# Patient Record
Sex: FEMALE | Race: BLACK OR AFRICAN AMERICAN | NOT HISPANIC OR LATINO | Employment: UNEMPLOYED | URBAN - METROPOLITAN AREA
[De-identification: names, ages, dates, MRNs, and addresses within clinical notes are randomized per-mention and may not be internally consistent; named-entity substitution may affect disease eponyms.]

---

## 2017-05-23 ENCOUNTER — HISTORICAL (OUTPATIENT)
Dept: ADMINISTRATIVE | Facility: HOSPITAL | Age: 37
End: 2017-05-23

## 2017-05-23 LAB
ABS NEUT (OLG): 6.85 X10(3)/MCL (ref 2.1–9.2)
ALBUMIN SERPL-MCNC: 3.4 GM/DL (ref 3.4–5)
ALBUMIN/GLOB SERPL: 1 {RATIO}
ALP SERPL-CCNC: 66 UNIT/L (ref 20–120)
ALT SERPL-CCNC: 14 UNIT/L
APPEARANCE, UA: CLEAR
AST SERPL-CCNC: 15 UNIT/L
BACTERIA #/AREA URNS AUTO: ABNORMAL /[HPF]
BASOPHILS # BLD AUTO: 0.06 X10(3)/MCL
BASOPHILS NFR BLD AUTO: 1 % (ref 0–1)
BILIRUB SERPL-MCNC: 0.4 MG/DL
BILIRUB UR QL STRIP: NEGATIVE
BILIRUBIN DIRECT+TOT PNL SERPL-MCNC: <0.1 MG/DL
BILIRUBIN DIRECT+TOT PNL SERPL-MCNC: >0.3 MG/DL
BUN SERPL-MCNC: 40 MG/DL (ref 7–25)
CALCIUM SERPL-MCNC: 8.9 MG/DL (ref 8.4–10.3)
CHLORIDE SERPL-SCNC: 103 MMOL/L (ref 96–110)
CO2 SERPL-SCNC: 20 MMOL/L (ref 24–32)
COLOR UR: YELLOW
CREAT SERPL-MCNC: 2.83 MG/DL (ref 0.7–1.1)
CREAT UR-MCNC: 85.6 MG/DL
DEPRECATED CALCIDIOL+CALCIFEROL SERPL-MC: 22.07 NG/ML (ref 30–80)
EOSINOPHIL # BLD AUTO: 0.2 10*3/UL
EOSINOPHIL NFR BLD AUTO: 2 % (ref 0–5)
ERYTHROCYTE [DISTWIDTH] IN BLOOD BY AUTOMATED COUNT: 12.5 % (ref 11.5–14.5)
GLOBULIN SER-MCNC: 3.3 GM/ML (ref 2.3–3.5)
GLUCOSE (UA): 500 MG/DL
GLUCOSE SERPL-MCNC: 345 MG/DL (ref 65–99)
HCT VFR BLD AUTO: 34.5 % (ref 35–46)
HGB BLD-MCNC: 11.4 GM/DL (ref 12–16)
HGB UR QL STRIP: NEGATIVE
HYALINE CASTS #/AREA URNS LPF: 0 /[LPF]
IMM GRANULOCYTES # BLD AUTO: 0.02 10*3/UL
IMM GRANULOCYTES NFR BLD AUTO: 0 %
KETONES UR QL STRIP: NEGATIVE
LEUKOCYTE ESTERASE UR QL STRIP: NEGATIVE
LYMPHOCYTES # BLD AUTO: 2.22 X10(3)/MCL
LYMPHOCYTES NFR BLD AUTO: 22 % (ref 15–40)
MAGNESIUM SERPL-MCNC: 1.9 MG/DL (ref 1.5–2.6)
MCH RBC QN AUTO: 29.6 PG (ref 26–34)
MCHC RBC AUTO-ENTMCNC: 33 GM/DL (ref 31–37)
MCV RBC AUTO: 89.6 FL (ref 80–100)
MONOCYTES # BLD AUTO: 0.55 X10(3)/MCL
MONOCYTES NFR BLD AUTO: 6 % (ref 4–12)
MUCOUS THREADS URNS QL MICRO: ABNORMAL
NEUTROPHILS # BLD AUTO: 6.85 X10(3)/MCL
NEUTROPHILS NFR BLD AUTO: 69 X10(3)/MCL
NITRITE UR QL STRIP: NEGATIVE
PH UR STRIP: 8 [PH] (ref 4.5–8)
PHOSPHATE SERPL-MCNC: 3 MG/DL (ref 2.5–4.7)
PLATELET # BLD AUTO: 354 X10(3)/MCL (ref 130–400)
PMV BLD AUTO: 11.2 FL (ref 7.4–10.4)
POTASSIUM SERPL-SCNC: 3.8 MMOL/L (ref 3.6–5.2)
PROT SERPL-MCNC: 6.7 GM/DL (ref 6–8)
PROT UR QL STRIP: >600 MG/DL
PROT UR STRIP-MCNC: 461 MG/DL
PROT/CREAT UR-RTO: 5385.5 MG/GM
PTH-INTACT SERPL-MCNC: 123 PG/ML (ref 12–65)
RBC # BLD AUTO: 3.85 X10(6)/MCL (ref 4–5.2)
RBC #/AREA URNS AUTO: ABNORMAL /[HPF]
SODIUM SERPL-SCNC: 132 MMOL/L (ref 135–146)
SP GR UR STRIP: 1 (ref 1–1.03)
SQUAMOUS #/AREA URNS LPF: >100 /[LPF]
UROBILINOGEN UR STRIP-ACNC: NORMAL
WBC # SPEC AUTO: 9.9 X10(3)/MCL (ref 4.5–11)
WBC #/AREA URNS AUTO: ABNORMAL /HPF

## 2017-09-22 ENCOUNTER — HISTORICAL (OUTPATIENT)
Dept: ADMINISTRATIVE | Facility: HOSPITAL | Age: 37
End: 2017-09-22

## 2017-09-22 LAB
ABS NEUT (OLG): 7.31 X10(3)/MCL (ref 2.1–9.2)
ALBUMIN SERPL-MCNC: 2.8 GM/DL (ref 3.4–5)
ALBUMIN/GLOB SERPL: 1 RATIO (ref 1–2)
ALP SERPL-CCNC: 95 UNIT/L (ref 45–117)
ALT SERPL-CCNC: 15 UNIT/L (ref 12–78)
APPEARANCE, UA: CLEAR
AST SERPL-CCNC: 9 UNIT/L (ref 15–37)
BACTERIA #/AREA URNS AUTO: ABNORMAL /[HPF]
BASOPHILS # BLD AUTO: 0.05 X10(3)/MCL
BASOPHILS NFR BLD AUTO: 0 % (ref 0–1)
BILIRUB SERPL-MCNC: 0.2 MG/DL (ref 0.2–1)
BILIRUB UR QL STRIP: NEGATIVE
BILIRUBIN DIRECT+TOT PNL SERPL-MCNC: <0.1 MG/DL
BILIRUBIN DIRECT+TOT PNL SERPL-MCNC: >0.1 MG/DL
BUN SERPL-MCNC: 26 MG/DL (ref 7–18)
CALCIUM SERPL-MCNC: 8.8 MG/DL (ref 8.5–10.1)
CHLORIDE SERPL-SCNC: 108 MMOL/L (ref 98–107)
CO2 SERPL-SCNC: 25 MMOL/L (ref 21–32)
COLOR UR: ABNORMAL
CREAT SERPL-MCNC: 2.6 MG/DL (ref 0.6–1.3)
CREAT UR-MCNC: 34 MG/DL
DEPRECATED CALCIDIOL+CALCIFEROL SERPL-MC: 19.17 NG/ML (ref 30–80)
EOSINOPHIL # BLD AUTO: 0.15 X10(3)/MCL
EOSINOPHIL NFR BLD AUTO: 2 % (ref 0–5)
ERYTHROCYTE [DISTWIDTH] IN BLOOD BY AUTOMATED COUNT: 11.9 % (ref 11.5–14.5)
GLOBULIN SER-MCNC: 3.8 GM/ML (ref 2.3–3.5)
GLUCOSE (UA): >1000 MG/DL
GLUCOSE SERPL-MCNC: 219 MG/DL (ref 74–106)
HCT VFR BLD AUTO: 31.3 % (ref 35–46)
HGB BLD-MCNC: 10.2 GM/DL (ref 12–16)
HGB UR QL STRIP: NEGATIVE
HYALINE CASTS #/AREA URNS LPF: ABNORMAL /[LPF]
IMM GRANULOCYTES # BLD AUTO: 0.02 10*3/UL
IMM GRANULOCYTES NFR BLD AUTO: 0 %
KETONES UR QL STRIP: NEGATIVE
LEUKOCYTE ESTERASE UR QL STRIP: NEGATIVE
LYMPHOCYTES # BLD AUTO: 1.5 X10(3)/MCL
LYMPHOCYTES NFR BLD AUTO: 16 % (ref 15–40)
MAGNESIUM SERPL-MCNC: 1.9 MG/DL (ref 1.8–2.4)
MCH RBC QN AUTO: 29.8 PG (ref 26–34)
MCHC RBC AUTO-ENTMCNC: 32.6 GM/DL (ref 31–37)
MCV RBC AUTO: 91.5 FL (ref 80–100)
MONOCYTES # BLD AUTO: 0.32 X10(3)/MCL
MONOCYTES NFR BLD AUTO: 3 % (ref 4–12)
NEUTROPHILS # BLD AUTO: 7.31 X10(3)/MCL
NEUTROPHILS NFR BLD AUTO: 78 X10(3)/MCL
NITRITE UR QL STRIP: NEGATIVE
PH UR STRIP: 6.5 [PH] (ref 4.5–8)
PHOSPHATE SERPL-MCNC: 3.9 MG/DL (ref 2.5–4.9)
PLATELET # BLD AUTO: 290 X10(3)/MCL (ref 130–400)
PMV BLD AUTO: 11.4 FL (ref 7.4–10.4)
POTASSIUM SERPL-SCNC: 4.7 MMOL/L (ref 3.5–5.1)
PROT SERPL-MCNC: 6.6 GM/DL (ref 6.4–8.2)
PROT UR QL STRIP: 600 MG/DL
PROT UR STRIP-MCNC: 356.7 MG/DL
PROT/CREAT UR-RTO: ABNORMAL MG/GM
PTH-INTACT SERPL-MCNC: 259.8 PG/ML (ref 13.8–85)
RBC # BLD AUTO: 3.42 X10(6)/MCL (ref 4–5.2)
RBC #/AREA URNS AUTO: ABNORMAL /[HPF]
SODIUM SERPL-SCNC: 140 MMOL/L (ref 136–145)
SP GR UR STRIP: 1 (ref 1–1.03)
SQUAMOUS #/AREA URNS LPF: ABNORMAL /[LPF]
UROBILINOGEN UR STRIP-ACNC: NORMAL
WBC # SPEC AUTO: 9.4 X10(3)/MCL (ref 4.5–11)
WBC #/AREA URNS AUTO: ABNORMAL /HPF

## 2018-09-06 ENCOUNTER — HISTORICAL (OUTPATIENT)
Dept: ADMINISTRATIVE | Facility: HOSPITAL | Age: 38
End: 2018-09-06

## 2018-09-06 LAB
ABS NEUT (OLG): 6.34 X10(3)/MCL (ref 2.1–9.2)
ALBUMIN SERPL-MCNC: 2.7 GM/DL (ref 3.4–5)
ALBUMIN/GLOB SERPL: 1 RATIO (ref 1–2)
ALP SERPL-CCNC: 81 UNIT/L (ref 45–117)
ALT SERPL-CCNC: 13 UNIT/L (ref 12–78)
APPEARANCE, UA: ABNORMAL
AST SERPL-CCNC: 9 UNIT/L (ref 15–37)
BACTERIA #/AREA URNS AUTO: ABNORMAL /[HPF]
BASOPHILS # BLD AUTO: 0.06 X10(3)/MCL
BASOPHILS NFR BLD AUTO: 1 %
BILIRUB SERPL-MCNC: 0.2 MG/DL (ref 0.2–1)
BILIRUB UR QL STRIP: NEGATIVE
BILIRUBIN DIRECT+TOT PNL SERPL-MCNC: <0.1 MG/DL
BILIRUBIN DIRECT+TOT PNL SERPL-MCNC: ABNORMAL MG/DL
BUN SERPL-MCNC: 55 MG/DL (ref 7–18)
CALCIUM SERPL-MCNC: 8 MG/DL (ref 8.5–10.1)
CHLORIDE SERPL-SCNC: 108 MMOL/L (ref 98–107)
CO2 SERPL-SCNC: 23 MMOL/L (ref 21–32)
COLOR UR: ABNORMAL
CREAT SERPL-MCNC: 4.2 MG/DL (ref 0.6–1.3)
CREAT UR-MCNC: 35 MG/DL
DEPRECATED CALCIDIOL+CALCIFEROL SERPL-MC: 13.33 NG/ML (ref 30–80)
EOSINOPHIL # BLD AUTO: 0.27 X10(3)/MCL
EOSINOPHIL NFR BLD AUTO: 3 %
ERYTHROCYTE [DISTWIDTH] IN BLOOD BY AUTOMATED COUNT: 11.6 % (ref 11.5–14.5)
FERRITIN SERPL-MCNC: 50.4 NG/ML (ref 10–150)
GLOBULIN SER-MCNC: 3.7 GM/ML (ref 2.3–3.5)
GLUCOSE (UA): >1000 MG/DL
GLUCOSE SERPL-MCNC: 131 MG/DL (ref 74–106)
HCT VFR BLD AUTO: 31.4 % (ref 35–46)
HGB BLD-MCNC: 9.9 GM/DL (ref 12–16)
HGB UR QL STRIP: 0.06 MG/DL
HYALINE CASTS #/AREA URNS LPF: ABNORMAL /[LPF]
IMM GRANULOCYTES # BLD AUTO: 0.03 10*3/UL
IMM GRANULOCYTES NFR BLD AUTO: 0 %
IRON SATN MFR SERPL: 36.5 % (ref 15–50)
IRON SERPL-MCNC: 84 MCG/DL (ref 50–170)
KETONES UR QL STRIP: NEGATIVE
LEUKOCYTE ESTERASE UR QL STRIP: 500 LEU/UL
LYMPHOCYTES # BLD AUTO: 1.77 X10(3)/MCL
LYMPHOCYTES NFR BLD AUTO: 20 % (ref 13–40)
MAGNESIUM SERPL-MCNC: 1.7 MG/DL (ref 1.8–2.4)
MCH RBC QN AUTO: 29 PG (ref 26–34)
MCHC RBC AUTO-ENTMCNC: 31.5 GM/DL (ref 31–37)
MCV RBC AUTO: 92.1 FL (ref 80–100)
MONOCYTES # BLD AUTO: 0.41 X10(3)/MCL
MONOCYTES NFR BLD AUTO: 5 % (ref 4–12)
NEUTROPHILS # BLD AUTO: 6.34 X10(3)/MCL
NEUTROPHILS NFR BLD AUTO: 72 X10(3)/MCL
NITRITE UR QL STRIP: NEGATIVE
PH UR STRIP: 6.5 [PH] (ref 4.5–8)
PHOSPHATE SERPL-MCNC: 6.3 MG/DL (ref 2.5–4.9)
PLATELET # BLD AUTO: 294 X10(3)/MCL (ref 130–400)
PMV BLD AUTO: 11.2 FL (ref 7.4–10.4)
POTASSIUM SERPL-SCNC: 4.1 MMOL/L (ref 3.5–5.1)
PROT SERPL-MCNC: 6.4 GM/DL (ref 6.4–8.2)
PROT UR QL STRIP: 600 MG/DL
PROT UR STRIP-MCNC: 403.9 MG/DL
PROT/CREAT UR-RTO: ABNORMAL MG/GM
PTH-INTACT SERPL-MCNC: 535.4 PG/ML (ref 18.4–80.1)
RBC # BLD AUTO: 3.41 X10(6)/MCL (ref 4–5.2)
RBC #/AREA URNS AUTO: ABNORMAL /[HPF]
SODIUM SERPL-SCNC: 141 MMOL/L (ref 136–145)
SP GR UR STRIP: 1.01 (ref 1–1.03)
SQUAMOUS #/AREA URNS LPF: >100 /[LPF]
TIBC SERPL-MCNC: 230 MCG/DL (ref 250–450)
TRANSFERRIN SERPL-MCNC: 188 MG/DL (ref 200–360)
UROBILINOGEN UR STRIP-ACNC: NORMAL
WBC # SPEC AUTO: 8.9 X10(3)/MCL (ref 4.5–11)
WBC #/AREA URNS AUTO: >=100 /HPF

## 2018-10-04 ENCOUNTER — HISTORICAL (OUTPATIENT)
Dept: LAB | Facility: HOSPITAL | Age: 38
End: 2018-10-04

## 2018-10-04 LAB
ALBUMIN SERPL-MCNC: 2.8 GM/DL (ref 3.4–5)
ALBUMIN/GLOB SERPL: 0.9 RATIO (ref 1.1–2)
ALP SERPL-CCNC: 66 UNIT/L (ref 46–116)
ALT SERPL-CCNC: 23 UNIT/L (ref 12–78)
AST SERPL-CCNC: 15 UNIT/L (ref 15–37)
BILIRUB SERPL-MCNC: 0.2 MG/DL (ref 0.2–1)
BILIRUBIN DIRECT+TOT PNL SERPL-MCNC: 0.09 MG/DL (ref 0–0.2)
BILIRUBIN DIRECT+TOT PNL SERPL-MCNC: 0.11 MG/DL (ref 0–0.8)
BUN SERPL-MCNC: 58 MG/DL (ref 7–18)
CALCIUM SERPL-MCNC: 8.3 MG/DL (ref 8.5–10.1)
CHLORIDE SERPL-SCNC: 107 MMOL/L (ref 98–107)
CO2 SERPL-SCNC: 22 MMOL/L (ref 21–32)
CREAT SERPL-MCNC: 4.42 MG/DL (ref 0.6–1.3)
FERRITIN SERPL-MCNC: 26 NG/ML (ref 8–388)
GLOBULIN SER-MCNC: 3 GM/DL (ref 2.4–3.5)
GLUCOSE SERPL-MCNC: 103 MG/DL (ref 74–106)
HCT VFR BLD AUTO: 30 % (ref 37–47)
HGB BLD-MCNC: 9.5 GM/DL (ref 12–16)
IRON SATN MFR SERPL: 12.6 % (ref 20–50)
IRON SERPL-MCNC: 31 MCG/DL (ref 50–175)
PHOSPHATE SERPL-MCNC: 5.5 MG/DL (ref 2.5–4.9)
POTASSIUM SERPL-SCNC: 4 MMOL/L (ref 3.5–5.1)
PROT SERPL-MCNC: 5.8 GM/DL (ref 6.4–8.2)
PTH-INTACT SERPL-MCNC: 579.4 PG/ML (ref 18.4–80.1)
SODIUM SERPL-SCNC: 141 MMOL/L (ref 136–145)
TIBC SERPL-MCNC: 246 MCG/DL (ref 250–450)
TRANSFERRIN SERPL-MCNC: 186 MG/DL (ref 200–360)
URATE SERPL-MCNC: 8.7 MG/DL (ref 2.6–7.2)

## 2018-10-23 ENCOUNTER — HISTORICAL (OUTPATIENT)
Dept: INFUSION THERAPY | Facility: HOSPITAL | Age: 38
End: 2018-10-23

## 2018-10-30 ENCOUNTER — HISTORICAL (OUTPATIENT)
Dept: INFUSION THERAPY | Facility: HOSPITAL | Age: 38
End: 2018-10-30

## 2018-11-01 ENCOUNTER — HISTORICAL (OUTPATIENT)
Dept: LAB | Facility: HOSPITAL | Age: 38
End: 2018-11-01

## 2018-11-01 LAB
ALBUMIN SERPL-MCNC: 2.9 GM/DL (ref 3.4–5)
ALBUMIN/GLOB SERPL: 0.9 RATIO (ref 1.1–2)
ALP SERPL-CCNC: 68 UNIT/L (ref 46–116)
ALT SERPL-CCNC: 10 UNIT/L (ref 12–78)
AST SERPL-CCNC: 11 UNIT/L (ref 15–37)
BILIRUB SERPL-MCNC: 0.3 MG/DL (ref 0.2–1)
BILIRUBIN DIRECT+TOT PNL SERPL-MCNC: 0.09 MG/DL (ref 0–0.2)
BILIRUBIN DIRECT+TOT PNL SERPL-MCNC: 0.21 MG/DL (ref 0–0.8)
BUN SERPL-MCNC: 50.7 MG/DL (ref 7–18)
CALCIUM SERPL-MCNC: 8.7 MG/DL (ref 8.5–10.1)
CHLORIDE SERPL-SCNC: 105 MMOL/L (ref 98–107)
CO2 SERPL-SCNC: 25.4 MMOL/L (ref 21–32)
CREAT SERPL-MCNC: 5.11 MG/DL (ref 0.6–1.3)
GLOBULIN SER-MCNC: 3.4 GM/DL (ref 2.4–3.5)
GLUCOSE SERPL-MCNC: 151 MG/DL (ref 74–106)
HCT VFR BLD AUTO: 31.4 % (ref 37–47)
HGB BLD-MCNC: 9.9 GM/DL (ref 12–16)
POTASSIUM SERPL-SCNC: 4.8 MMOL/L (ref 3.5–5.1)
PROT SERPL-MCNC: 6.3 GM/DL (ref 6.4–8.2)
SODIUM SERPL-SCNC: 141 MMOL/L (ref 136–145)

## 2018-12-19 ENCOUNTER — HISTORICAL (OUTPATIENT)
Dept: ADMINISTRATIVE | Facility: HOSPITAL | Age: 38
End: 2018-12-19

## 2018-12-19 LAB
ABS NEUT (OLG): 5.06 X10(3)/MCL (ref 2.1–9.2)
BASOPHILS # BLD AUTO: 0.1 X10(3)/MCL (ref 0–0.2)
BASOPHILS NFR BLD AUTO: 1 %
BUN SERPL-MCNC: 28 MG/DL (ref 7–18)
CALCIUM SERPL-MCNC: 7.8 MG/DL (ref 8.5–10.1)
CHLORIDE SERPL-SCNC: 95 MMOL/L (ref 98–107)
CO2 SERPL-SCNC: 27 MMOL/L (ref 21–32)
CREAT SERPL-MCNC: 4.26 MG/DL (ref 0.55–1.02)
CREAT/UREA NIT SERPL: 6.6
EOSINOPHIL # BLD AUTO: 0.3 X10(3)/MCL (ref 0–0.9)
EOSINOPHIL NFR BLD AUTO: 3 %
ERYTHROCYTE [DISTWIDTH] IN BLOOD BY AUTOMATED COUNT: 13.4 % (ref 11.5–17)
GLUCOSE SERPL-MCNC: 254 MG/DL (ref 74–106)
HCT VFR BLD AUTO: 35.9 % (ref 37–47)
HGB BLD-MCNC: 10.7 GM/DL (ref 12–16)
LYMPHOCYTES # BLD AUTO: 1.6 X10(3)/MCL (ref 0.6–4.6)
LYMPHOCYTES NFR BLD AUTO: 22 %
MCH RBC QN AUTO: 29.6 PG (ref 27–31)
MCHC RBC AUTO-ENTMCNC: 29.8 GM/DL (ref 33–36)
MCV RBC AUTO: 99.4 FL (ref 80–94)
MONOCYTES # BLD AUTO: 0.6 X10(3)/MCL (ref 0.1–1.3)
MONOCYTES NFR BLD AUTO: 7 %
NEUTROPHILS # BLD AUTO: 5.06 X10(3)/MCL (ref 2.1–9.2)
NEUTROPHILS NFR BLD AUTO: 66 %
PLATELET # BLD AUTO: 275 X10(3)/MCL (ref 130–400)
PMV BLD AUTO: 10.7 FL (ref 9.4–12.4)
POTASSIUM SERPL-SCNC: 4.1 MMOL/L (ref 3.5–5.1)
RBC # BLD AUTO: 3.61 X10(6)/MCL (ref 4.2–5.4)
SODIUM SERPL-SCNC: 133 MMOL/L (ref 136–145)
WBC # SPEC AUTO: 7.6 X10(3)/MCL (ref 4.5–11.5)

## 2019-02-07 ENCOUNTER — TELEPHONE (OUTPATIENT)
Dept: TRANSPLANT | Facility: CLINIC | Age: 39
End: 2019-02-07

## 2019-03-20 DIAGNOSIS — Z76.82 ORGAN TRANSPLANT CANDIDATE: ICD-10-CM

## 2019-03-20 PROBLEM — K21.9 GERD (GASTROESOPHAGEAL REFLUX DISEASE): Status: ACTIVE | Noted: 2018-03-14

## 2019-03-20 PROBLEM — N17.9 ACUTE RENAL FAILURE SUPERIMPOSED ON STAGE 5 CHRONIC KIDNEY DISEASE, NOT ON CHRONIC DIALYSIS: Status: RESOLVED | Noted: 2018-10-22 | Resolved: 2019-03-20

## 2019-03-20 PROBLEM — N18.5 ACUTE RENAL FAILURE SUPERIMPOSED ON STAGE 5 CHRONIC KIDNEY DISEASE, NOT ON CHRONIC DIALYSIS: Status: ACTIVE | Noted: 2018-10-22

## 2019-03-20 PROBLEM — N18.6 ESRD (END STAGE RENAL DISEASE): Status: ACTIVE | Noted: 2018-10-26

## 2019-03-20 PROBLEM — N18.5 ACUTE RENAL FAILURE SUPERIMPOSED ON STAGE 5 CHRONIC KIDNEY DISEASE, NOT ON CHRONIC DIALYSIS: Status: RESOLVED | Noted: 2018-10-22 | Resolved: 2019-03-20

## 2019-03-20 PROBLEM — N17.9 ACUTE RENAL FAILURE SUPERIMPOSED ON STAGE 5 CHRONIC KIDNEY DISEASE, NOT ON CHRONIC DIALYSIS: Status: ACTIVE | Noted: 2018-10-22

## 2019-03-20 PROBLEM — E11.9 DIABETES MELLITUS: Status: ACTIVE | Noted: 2018-03-14

## 2019-04-03 ENCOUNTER — TELEPHONE (OUTPATIENT)
Dept: TRANSPLANT | Facility: CLINIC | Age: 39
End: 2019-04-03

## 2019-04-03 ENCOUNTER — HOSPITAL ENCOUNTER (OUTPATIENT)
Dept: RADIOLOGY | Facility: HOSPITAL | Age: 39
Discharge: HOME | End: 2019-04-03
Attending: NURSE PRACTITIONER
Payer: MEDICARE

## 2019-04-03 ENCOUNTER — CLINICAL SUPPORT (OUTPATIENT)
Dept: INFECTIOUS DISEASES | Facility: CLINIC | Age: 39
End: 2019-04-03
Payer: MEDICARE

## 2019-04-03 ENCOUNTER — OFFICE VISIT (OUTPATIENT)
Dept: TRANSPLANT | Facility: CLINIC | Age: 39
End: 2019-04-03
Payer: MEDICAID

## 2019-04-03 ENCOUNTER — HOSPITAL ENCOUNTER (OUTPATIENT)
Dept: RADIOLOGY | Facility: HOSPITAL | Age: 39
Discharge: HOME | End: 2019-04-03
Attending: NURSE PRACTITIONER
Payer: MEDICAID

## 2019-04-03 VITALS
TEMPERATURE: 97 F | WEIGHT: 224 LBS | HEIGHT: 65 IN | OXYGEN SATURATION: 100 % | DIASTOLIC BLOOD PRESSURE: 86 MMHG | RESPIRATION RATE: 18 BRPM | BODY MASS INDEX: 37.32 KG/M2 | SYSTOLIC BLOOD PRESSURE: 183 MMHG | HEART RATE: 79 BPM

## 2019-04-03 DIAGNOSIS — D63.1 ANEMIA IN ESRD (END-STAGE RENAL DISEASE): ICD-10-CM

## 2019-04-03 DIAGNOSIS — Z99.2 ESRD ON HEMODIALYSIS: ICD-10-CM

## 2019-04-03 DIAGNOSIS — Z76.82 ORGAN TRANSPLANT CANDIDATE: ICD-10-CM

## 2019-04-03 DIAGNOSIS — Z76.82 ORGAN TRANSPLANT CANDIDATE: Primary | ICD-10-CM

## 2019-04-03 DIAGNOSIS — I15.0 RENOVASCULAR HYPERTENSION: ICD-10-CM

## 2019-04-03 DIAGNOSIS — N18.6 ANEMIA IN ESRD (END-STAGE RENAL DISEASE): ICD-10-CM

## 2019-04-03 DIAGNOSIS — N18.6 ESRD ON HEMODIALYSIS: ICD-10-CM

## 2019-04-03 DIAGNOSIS — Z01.818 PRE-TRANSPLANT EVALUATION FOR CHRONIC KIDNEY DISEASE: ICD-10-CM

## 2019-04-03 DIAGNOSIS — N18.6 ESRD (END STAGE RENAL DISEASE): ICD-10-CM

## 2019-04-03 PROCEDURE — 72170 X-RAY EXAM OF PELVIS: CPT | Mod: 26,TXP,, | Performed by: RADIOLOGY

## 2019-04-03 PROCEDURE — 99205 PR OFFICE/OUTPT VISIT, NEW, LEVL V, 60-74 MIN: ICD-10-PCS | Mod: S$PBB,TXP,, | Performed by: NURSE PRACTITIONER

## 2019-04-03 PROCEDURE — 90715 TDAP VACCINE 7 YRS/> IM: CPT | Mod: PBBFAC,TXP

## 2019-04-03 PROCEDURE — 99204 PR OFFICE/OUTPT VISIT, NEW, LEVL IV, 45-59 MIN: ICD-10-PCS | Mod: S$PBB,TXP,, | Performed by: NURSE PRACTITIONER

## 2019-04-03 PROCEDURE — 71046 X-RAY EXAM CHEST 2 VIEWS: CPT | Mod: TC,TXP

## 2019-04-03 PROCEDURE — 76770 US RETROPERITONEAL COMPLETE: ICD-10-PCS | Mod: 26,TXP,, | Performed by: RADIOLOGY

## 2019-04-03 PROCEDURE — 76770 US EXAM ABDO BACK WALL COMP: CPT | Mod: TC,TXP

## 2019-04-03 PROCEDURE — 99205 OFFICE O/P NEW HI 60 MIN: CPT | Mod: S$PBB,TXP,, | Performed by: NURSE PRACTITIONER

## 2019-04-03 PROCEDURE — 90472 IMMUNIZATION ADMIN EACH ADD: CPT | Mod: PBBFAC,TXP

## 2019-04-03 PROCEDURE — 76770 US EXAM ABDO BACK WALL COMP: CPT | Mod: 26,TXP,, | Performed by: RADIOLOGY

## 2019-04-03 PROCEDURE — 93978 VASCULAR STUDY: CPT | Mod: TC,TXP

## 2019-04-03 PROCEDURE — 72170 XR PELVIS ROUTINE AP: ICD-10-PCS | Mod: 26,TXP,, | Performed by: RADIOLOGY

## 2019-04-03 PROCEDURE — 99204 OFFICE O/P NEW MOD 45 MIN: CPT | Mod: S$PBB,TXP,, | Performed by: TRANSPLANT SURGERY

## 2019-04-03 PROCEDURE — 71046 X-RAY EXAM CHEST 2 VIEWS: CPT | Mod: 26,TXP,, | Performed by: RADIOLOGY

## 2019-04-03 PROCEDURE — 99999 PR PBB SHADOW E&M-EST. PATIENT-LVL V: CPT | Mod: PBBFAC,TXP,, | Performed by: NURSE PRACTITIONER

## 2019-04-03 PROCEDURE — 99204 OFFICE O/P NEW MOD 45 MIN: CPT | Mod: S$PBB,TXP,, | Performed by: NURSE PRACTITIONER

## 2019-04-03 PROCEDURE — 90471 IMMUNIZATION ADMIN: CPT | Mod: PBBFAC,TXP

## 2019-04-03 PROCEDURE — 99204 PR OFFICE/OUTPT VISIT, NEW, LEVL IV, 45-59 MIN: ICD-10-PCS | Mod: S$PBB,TXP,, | Performed by: TRANSPLANT SURGERY

## 2019-04-03 PROCEDURE — 99215 OFFICE O/P EST HI 40 MIN: CPT | Mod: PBBFAC,25,TXP | Performed by: NURSE PRACTITIONER

## 2019-04-03 PROCEDURE — 93978 VASCULAR STUDY: CPT | Mod: 26,TXP,, | Performed by: RADIOLOGY

## 2019-04-03 PROCEDURE — 72170 X-RAY EXAM OF PELVIS: CPT | Mod: TC,TXP

## 2019-04-03 PROCEDURE — 99999 PR PBB SHADOW E&M-EST. PATIENT-LVL V: ICD-10-PCS | Mod: PBBFAC,TXP,, | Performed by: NURSE PRACTITIONER

## 2019-04-03 PROCEDURE — 90732 PPSV23 VACC 2 YRS+ SUBQ/IM: CPT | Mod: PBBFAC,TXP

## 2019-04-03 PROCEDURE — 71046 XR CHEST PA AND LATERAL: ICD-10-PCS | Mod: 26,TXP,, | Performed by: RADIOLOGY

## 2019-04-03 PROCEDURE — 93978 US DOPP ILIACS BILATERAL: ICD-10-PCS | Mod: 26,TXP,, | Performed by: RADIOLOGY

## 2019-04-03 RX ORDER — INSULIN ASPART 100 [IU]/ML
12 INJECTION, SOLUTION INTRAVENOUS; SUBCUTANEOUS 2 TIMES DAILY
COMMUNITY
End: 2023-11-28

## 2019-04-03 RX ORDER — HYDRALAZINE HYDROCHLORIDE 25 MG/1
25 TABLET, FILM COATED ORAL 3 TIMES DAILY
COMMUNITY
Start: 2019-02-11 | End: 2022-12-30

## 2019-04-03 RX ORDER — LOSARTAN POTASSIUM 100 MG/1
100 TABLET ORAL DAILY
COMMUNITY
Start: 2019-02-11 | End: 2022-12-30

## 2019-04-03 RX ORDER — TRAMADOL HYDROCHLORIDE 50 MG/1
50 TABLET ORAL EVERY 8 HOURS PRN
COMMUNITY
End: 2021-07-07

## 2019-04-03 RX ORDER — ONDANSETRON 4 MG/1
4 TABLET, FILM COATED ORAL EVERY 12 HOURS PRN
COMMUNITY
End: 2022-12-30

## 2019-04-03 RX ORDER — PANTOPRAZOLE SODIUM 40 MG/1
40 TABLET, DELAYED RELEASE ORAL DAILY
COMMUNITY
Start: 2019-02-11

## 2019-04-03 RX ORDER — METOPROLOL TARTRATE 50 MG/1
75 TABLET ORAL 2 TIMES DAILY
COMMUNITY
Start: 2019-02-11 | End: 2022-12-30

## 2019-04-03 RX ORDER — PRAVASTATIN SODIUM 80 MG/1
80 TABLET ORAL NIGHTLY
COMMUNITY
Start: 2019-02-11 | End: 2022-12-30

## 2019-04-03 RX ORDER — PEN NEEDLE, DIABETIC 32GX 5/32"
NEEDLE, DISPOSABLE MISCELLANEOUS
COMMUNITY
Start: 2019-02-11 | End: 2022-12-30

## 2019-04-03 RX ORDER — TRAZODONE HYDROCHLORIDE 100 MG/1
100 TABLET ORAL NIGHTLY
COMMUNITY
Start: 2019-02-11 | End: 2022-12-30

## 2019-04-03 RX ORDER — INSULIN GLARGINE 100 [IU]/ML
10 INJECTION, SOLUTION SUBCUTANEOUS NIGHTLY
COMMUNITY
End: 2023-11-28

## 2019-04-03 RX ORDER — METOCLOPRAMIDE 10 MG/1
5 TABLET ORAL
COMMUNITY
End: 2021-07-07

## 2019-04-03 RX ORDER — AMLODIPINE BESYLATE 10 MG/1
10 TABLET ORAL DAILY
COMMUNITY
Start: 2019-02-11

## 2019-04-03 NOTE — PROGRESS NOTES
Transplant Nephrology  Kidney/Pancreas Transplant Recipient Evaluation    Referring Physician: Kirit Masterson  Current Nephrologist: Kirit Masterson    Subjective:   CC:  Initial evaluation of kidney transplant candidacy.    HPI:  Ms. Mcgrath is a 38 y.o. year old Black or  female who has presented to be evaluated as a potential kidney transplant recipient.  She has ESRD secondary to diabetic nephropathy.  Patient is currently on hemodialysis started on 2018. Patient is dialyzing on TTS schedule.  Patient reports that she is tolerating dialysis well..She dialyzes for 4 hours per session, dry weight ~ 102 kg.  She has a LUE AV fistula, R AV graft and right chest catheter for dialysis access.     Previous Transplant: no    Past Medical and Surgical History: Ms. Mcgrath  has a past medical history of Anemia, Diabetes mellitus, Disorder of kidney and ureter, GERD (gastroesophageal reflux disease), Hyperlipidemia, Hypertension, IDDM (insulin dependent diabetes mellitus), Obesity, and Preeclampsia.  She has a past surgical history that includes  section and AV fistula placement.    Past Social and Family History: Ms. Mcgrath reports that she has never smoked. She has never used smokeless tobacco. She reports that she does not drink alcohol or use drugs. Her family history includes Asthma in her sister; Diabetes in her maternal aunt and maternal uncle; Heart disease in her maternal grandfather and maternal grandmother; Hypertension in her father and mother; Kidney disease in her paternal uncle; Stroke in her maternal grandmother; Tuberculosis in her mother.      IDDM since age 15  Denies gastroparesis, retinopathy or neuropathy associated with diabetes.   Still has hypoglycemic awareness.   Kidney biopsy --no  Donors --no  Denies childhood illness    HTN since     Body mass index is 37.82 kg/m².  Encourage lifestyle modifications: diet, exercise, weight loss     A2  CSECTIONS  2  miscarriages    Pregnancy #1 --> miscarriage @6 months anencephalic   Pregnancy #2 -->miscarriage  @20 weeks for incompetent cervix   Reports preeclampsia for Pregnancy # 3 and 4 with premature delivery  #3 labor @ 8 mnoths  #4 labor @ 7 months    Does not work but is very active her 2 boys age 11 and 14.   Keeps up with housework and errands. No problems with SOB, chest pain or leg pain.       Past Medical History:   Diagnosis Date    Anemia     Diabetes mellitus     Disorder of kidney and ureter     GERD (gastroesophageal reflux disease)     Hyperlipidemia     Hypertension     IDDM (insulin dependent diabetes mellitus)     Obesity     Preeclampsia        Review of Systems   Constitutional: Positive for fatigue. Negative for activity change, appetite change, chills, fever and unexpected weight change.   HENT: Negative for congestion, facial swelling, postnasal drip, rhinorrhea, sinus pressure, sore throat and trouble swallowing.    Eyes: Positive for visual disturbance. Negative for pain and redness.        Wears glasses   Respiratory: Negative for cough, chest tightness, shortness of breath and wheezing.    Cardiovascular: Positive for leg swelling. Negative for chest pain and palpitations.   Gastrointestinal: Positive for constipation. Negative for abdominal pain, diarrhea, nausea and vomiting.        Acid reflux   Genitourinary: Negative for dysuria, flank pain and urgency.   Musculoskeletal: Negative for gait problem, neck pain and neck stiffness.   Skin: Negative for rash.   Allergic/Immunologic: Negative for environmental allergies, food allergies and immunocompromised state.   Neurological: Negative for dizziness, weakness, light-headedness and headaches.   Psychiatric/Behavioral: Positive for sleep disturbance. Negative for agitation and confusion. The patient is not nervous/anxious.        Objective:   Blood pressure (!) 183/86, pulse 79, temperature 96.6 °F (35.9 °C), temperature source Oral,  "resp. rate 18, height 5' 4.53" (1.639 m), weight 101.6 kg (223 lb 15.8 oz), SpO2 100 %.body mass index is 37.82 kg/m².    Physical Exam   Constitutional: She is oriented to person, place, and time. She appears well-developed and well-nourished.   HENT:   Head: Normocephalic.   Mouth/Throat: Oropharynx is clear and moist. No oropharyngeal exudate.   Eyes: Pupils are equal, round, and reactive to light. Conjunctivae and EOM are normal. No scleral icterus.   Neck: Normal range of motion. Neck supple.   Cardiovascular: Normal rate, regular rhythm and normal heart sounds.   Pulmonary/Chest: Effort normal and breath sounds normal.   Abdominal: Soft. Normal appearance and bowel sounds are normal. She exhibits no distension and no mass. There is no splenomegaly or hepatomegaly. There is no tenderness. There is no rebound, no guarding, no CVA tenderness, no tenderness at McBurney's point and negative Foley's sign.   Musculoskeletal: Normal range of motion. She exhibits edema.        Arms:  Bilateral trace peripheral edema    Lymphadenopathy:     She has no cervical adenopathy.   Neurological: She is alert and oriented to person, place, and time. She exhibits normal muscle tone. Coordination normal.   Skin: Skin is warm and dry.   Psychiatric: She has a normal mood and affect. Her behavior is normal.   Vitals reviewed.      Labs:  Lab Results   Component Value Date    WBC 9.22 04/03/2019    HGB 11.4 (L) 04/03/2019    HCT 36.7 (L) 04/03/2019     04/03/2019    K 3.6 04/03/2019    CL 97 04/03/2019    CO2 28 04/03/2019    BUN 28 (H) 04/03/2019    CREATININE 4.4 (H) 04/03/2019    EGFRNONAA 11.9 (A) 04/03/2019    CALCIUM 9.6 04/03/2019    PHOS 2.6 (L) 04/03/2019    ALBUMIN 3.9 04/03/2019    AST 13 04/03/2019    ALT <5 (L) 04/03/2019    .0 (H) 04/03/2019       No results found for: PREALBUMIN, BILIRUBINUA, GGT, AMYLASE, LIPASE, PROTEINUA, NITRITE, RBCUA, WBCUA    No results found for: HLAABCTYPE    Labs were reviewed " with the patient.    Assessment:     1. Organ transplant candidate    2. Pre-transplant evaluation for chronic kidney disease    3. ESRD (end stage renal disease)    4. Anemia in ESRD (end-stage renal disease)    5. ESRD on hemodialysis    6. Renovascular hypertension    7. BMI 37.0-37.9, adult        Plan:   Body mass index is 37.82 kg/m².  Would need to lose  weight for pancreas txp consideration ~ 60 lbs    Get imaging for CTA/P 2017 [facility]      Transplant Candidacy:   Based on available information, Ms. Mcgrath is a suitable kidney transplant candidate.   Meets center eligibility for accepting HCV+ donor offer - yes.  Patient educated on HCV+ donors. Korey is willing to accept HCV+ donor offer - no , pt declined   Patient is a candidate for KDPI > 85 kidney donor offer - no d/t age and weight.  Final determination of transplant candidacy will be made once workup is complete and reviewed by the selection committee.    Mallory Burgos, ASHLY       UNOS Patient Status  Functional Status: 60% - Requires occasional assistance but is able to care for needs  Physical Capacity: No Limitations

## 2019-04-03 NOTE — TELEPHONE ENCOUNTER
Reviewed pt transplant labs.  Notified dialysis unit dietitian of the following abnormal labs via fax and requested their most recent nutrition note on this pt.  Once this note is received it will be scanned into pt's chart.    Ha1c 9.1  Glu 253  Phos 2.6

## 2019-04-03 NOTE — PROGRESS NOTES
Transplant Surgery  Kidney Transplant Recipient Evaluation    Referring Physician: Kirit Masterson  Current Nephrologist: Kirit Masterson    Subjective:     Reason for Visit: evaluate transplant candidacy    History of Present Illness: Korey Mcgrath is a 38 y.o. year old female undergoing transplant evaluation.    Dialysis History: Korey is on hemodialysis.      Transplant History: N/A    Etiology of Renal Disease:  (based on medical records from referral).    Review of Systems   Constitutional: Negative for activity change and chills.   HENT: Negative for congestion and sore throat.    Eyes: Negative for discharge and redness.   Respiratory: Negative for cough and shortness of breath.    Cardiovascular: Negative for chest pain and palpitations.   Gastrointestinal: Negative for nausea and vomiting.   Endocrine: Negative for polydipsia and polyuria.   Genitourinary: Negative for dysuria and frequency.   Musculoskeletal: Negative for arthralgias and gait problem.   Skin: Negative for pallor and rash.   Neurological: Negative for dizziness and light-headedness.   Hematological: Negative for adenopathy. Does not bruise/bleed easily.   Psychiatric/Behavioral: Negative for agitation and suicidal ideas.       Objective:     Physical Exam:  Constitutional:   Vitals reviewed: yes   Well-nourished and well-groomed: yes  Eyes:   Sclerae icteric: no   Extraocular movements intact: yes  GI:    Bowel sounds normal: yes   Tenderness: no    If yes, quadrant/location: not applicable   Palpable masses: no    If yes, quadrant/location: not applicable   Hepatosplenomegaly: no   Ascites: no   Hernia: no    If yes, type/location: not applicable   Surgical scars: yes    If yes, type/location: Pfannenstiel  Resp:   Effort normal: yes   Breath sounds normal: yes    CV:   Regular rate and rhythm: yes   Heart sounds normal: yes   Femoral pulses normal: yes   Extremities edematous: no  Skin:   Rashes or lesions present: no    If yes,  describe:not applicable   Jaundice:: no    Musculoskeletal:   Gait normal: yes   Strength normal: yes  Psych:   Oriented to person, place, and time: yes   Affect and mood normal: yes    Additional comments: not applicable    Counseling: We provided Korey Mcgrath with a group education session today.  We discussed kidney transplantation at length with her, including risks, potential complications, and alternatives in the management of her renal failure.  The discussion included complications related to anesthesia, bleeding, infection, primary nonfunction, and ATN.  I discussed the typical postoperative course, length of hospitalization, the need for long-term immunosuppression, and the need for long-term routine follow-up.  I discussed living-donor and -donor transplantation and the relative advantages and disadvantages of each.  I also discussed average waiting times for both living donation and  donation.  I discussed national and center-specific survival rates.  I also mentioned the potential benefit of multicenter listing to candidates listed with centers within more than one organ procurement organization.  All questions were answered.    Final determination of transplant candidacy will be made once evaluation is complete and reviewed by the Kidney & Kidney/Pancreas Selection Committee.         Transplant Surgery - Candidacy   Assessment/Plan:   Korey Mcgrath has end stage renal disease (ESRD) on dialysis. I see no surgical contraindication to placing a kidney transplant. Based on available information, Korey Mcgrath is a suitable kidney transplant candidate. She expressed interest in pancreas transplantation, but is currently well above the acceptable BMI. I informed her she would need a target weight of 170 pounds or less to be considered for pancreas transplant.    Derick Thomson MD

## 2019-04-03 NOTE — PROGRESS NOTES
Pre Transplant Infectious Diseases Consult  Kidney Transplant Recipient Evaluation    Requesting Physician:     Reason for Visit:  Pre-kidney transplant evaluation    History of Present Illness  Korey Mcgrath is a 38 y.o. year old Black or  female with advanced Kidney disease currently being evaluated for Kidney transplant.  Patient is currently on HD 3 X per week.  Tolerating well.     History of HD catheter infection in December.  Removed and replaced.  No problems since.     Patient denies any recent fever, chills, or infective illnesses.      1) Do you have a history of:   YES NO   Diabetes      [x] []     Diabetic Foot Infection/Bone Infection  []        [x]     Surgical Removal of Spleen   []  [x]                  2) Have you had recurrent infections involving:             YES NO  Sinus infections  []         [x]   Sore Throat   []         [x]                 Prostate Infections  []         [x]              Bladder Infections  []         [x]                     Kidney Infections  []         [x]                               Intestinal Infections  []         [x]      Skin Infections   []         [x]       Reproductive Infections          []  [x]   Periodontal Disease  []         [x]        3)Have you ever had: YES     NO UNKNOWN      Chicken Pox   [x]         []  []   Shingles   []         [x]  []   Orolabial Herpes             []  [x]  []   Genital Herpes  []         [x]  []   Cytomegalovirus  []         [x]  []   Jeb-Barr Virus  []         [x]  []   Genital Warts   []         [x]  []   Hepatitis A   []         [x]  []   Hepatitis B   []         [x]  []   Hepatitis C   []         [x]  []   Syphilis   []         [x]  []   Gonorrhea   []         [x]  []   Pelvic Inflammatory   Disease   []         [x]  []   Chlamydia Infection  []         [x]  []   Intestinal parasites   or worms   []         [x]  []   Fungal Infections  []         [x]  []   Blood Infections  []         [x]  []        Comment:       4) Have you ever been exposed   YES NO  To someone with tuberculosis?  []   [x]   If yes, what treatment did you receive:     5) What states have you lived in? [place]     6) What countries have you visited for more than 2 weeks?    No foreign travel                      YES NO  7) Did you have any associated infections?  []  [] n/a    8) Are you planning to travel outside the    []  [x]   [place] after your transplant?    9) Household                   YES NO  Do you have pets living in your house?    []         [x]   If yes, describe:     Do you spend time or live on a farm or     []         [x]   have livestock or other farm animals?  If yes, which ones:    Do you have a fish tank?          []  [x]       Do you have a litter box?      []         [x]     Do you fish or hunt?       []         [x]     Do you clean or skin fish or animals?    []         [x]     Do you consume raw or undercooked    []         [x]   meat, fish, or shellfish?      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   [employer], manager of [employer].   11) Patient reports hobby to be rest, indoor activities           12)Do you garden or otherwise  YES NO   work in the soil?    [x]         []   13)Do you hike, camp, or spend     time in wooded areas?   []         [x]        14) The patient's immunization history was reviewed.    Have you ever received:  YES NO UNKNOWN DATES   Routine Childhood vaccines  [x]         []  []      Influenza vaccine   []  [x]  []    Prevnar 11/21/18   Pneumovax    []  [x]  []     Tetanus-diptheria   []         []  [x]    Hepatitis A vaccine series       []  [x]  []    Hepatitis B vaccine series         [x]  []  [] received in dialysis. Last dose 2/12/19   Meningitis vaccine   []         []  [x]    Varicella vaccine   []         [x]  []        Based on the patients immunization history and serologies, immunizations were ordered:         Ordered  Not Ordered  Influenza  Vaccine     [x]    []   Hepatitis A series at 0,  6 months   [x]    []   Hepatitis B seriesat 0, 1, and 6 months  []    [x]   Hepatitis B High Dose 0,1, and 6 months  []    [x]   Prevnar      []    [x]   Pneumovax      [x]    []    TDap       [x]    []    Zoster       []    [x]   Menactra      []    [x]            The patient was encouraged to contact us about any problems that may develop after immunization and possible side effects were reviewed.      Previous Transplant: no    Etiology of Kidney Disease: diabetic nephropathy    Allergies: Nitrofurantoin monohyd/m-cryst and Ibuprofen    There is no immunization history on file for this patient.  No past medical history on file.  No past surgical history on file.   Social History     Socioeconomic History    Marital status: Single     Spouse name: Not on file    Number of children: Not on file    Years of education: Not on file    Highest education level: Not on file   Occupational History    Not on file   Social Needs    Financial resource strain: Not on file    Food insecurity:     Worry: Not on file     Inability: Not on file    Transportation needs:     Medical: Not on file     Non-medical: Not on file   Tobacco Use    Smoking status: Not on file   Substance and Sexual Activity    Alcohol use: Not on file    Drug use: Not on file    Sexual activity: Not on file   Lifestyle    Physical activity:     Days per week: Not on file     Minutes per session: Not on file    Stress: Not on file   Relationships    Social connections:     Talks on phone: Not on file     Gets together: Not on file     Attends Holiness service: Not on file     Active member of club or organization: Not on file     Attends meetings of clubs or organizations: Not on file     Relationship status: Not on file   Other Topics Concern    Not on file   Social History Narrative    Not on file       Review of Systems   Constitution: Positive for decreased appetite, malaise/fatigue and  night sweats (occasional.  Not drenching ). Negative for chills, fever, weight gain and weight loss.   HENT: Negative for congestion, ear pain, hearing loss, hoarse voice, sore throat and tinnitus.    Eyes: Negative for blurred vision, redness and visual disturbance.   Cardiovascular: Positive for leg swelling. Negative for chest pain and palpitations.   Respiratory: Positive for shortness of breath ( with exertion). Negative for cough, hemoptysis, sputum production and wheezing.    Endocrine: Negative for cold intolerance and heat intolerance.   Hematologic/Lymphatic: Negative for adenopathy. Bruises/bleeds easily.   Skin: Negative for dry skin, itching, rash and suspicious lesions.   Musculoskeletal: Negative for back pain, joint pain, myalgias and neck pain.   Gastrointestinal: Positive for constipation, heartburn and nausea. Negative for abdominal pain, diarrhea and vomiting.   Genitourinary: Negative for dysuria and flank pain.        Decreased urine volume     Neurological: Positive for dizziness. Negative for headaches, numbness, paresthesias and weakness.   Psychiatric/Behavioral: Negative for depression and memory loss. The patient has insomnia. The patient is not nervous/anxious.    Allergic/Immunologic: Negative for environmental allergies, HIV exposure, hives and persistent infections.     Physical Exam   Constitutional: She is oriented to person, place, and time. She appears well-developed and well-nourished. No distress.       HENT:   Head: Normocephalic and atraumatic.   Mouth/Throat: Uvula is midline, oropharynx is clear and moist and mucous membranes are normal. She does not have dentures. No oral lesions. Normal dentition. No dental abscesses, lacerations or dental caries.   Eyes: Conjunctivae and EOM are normal. No scleral icterus.   Neck: Normal range of motion.   Cardiovascular: Normal rate, regular rhythm and normal heart sounds. Exam reveals no gallop and no friction rub.   No murmur  heard.  Pulmonary/Chest: Effort normal and breath sounds normal. No respiratory distress. She has no wheezes. She has no rales.   Abdominal: Soft. Bowel sounds are normal. She exhibits no distension. There is no hepatosplenomegaly. There is no tenderness. There is no guarding.   Musculoskeletal: She exhibits edema (mild bilateral ankle edema).   Lymphadenopathy:        Head (right side): No submental, no submandibular, no tonsillar, no preauricular, no posterior auricular and no occipital adenopathy present.        Head (left side): No submental, no submandibular, no tonsillar, no preauricular, no posterior auricular and no occipital adenopathy present.     She has no cervical adenopathy.     She has no axillary adenopathy.        Right: No inguinal, no supraclavicular and no epitrochlear adenopathy present.        Left: No inguinal, no supraclavicular and no epitrochlear adenopathy present.   Neurological: She is alert and oriented to person, place, and time.   Skin: Skin is warm, dry and intact. No rash noted.   Psychiatric: She has a normal mood and affect.   Vitals reviewed.    Diagnostics: No results found for: RPR  No results found for: CMVANTIBODIE  No results found for: HIV1X2  No results found for: HTLVIIIANTIB  No results found for: HEPBSAG  No results found for: HEPBCAB  No results found for: HEPCAB  No results found for: TOXOIGG  No components found for: TOXOIGGINTER  No results found for: BSF1LOE  No results found for: AVI0EVZ  No results found for: VARICELLAZOS  No results found for: VARICELLAINT  No results found for: STRONGANTIGG  No results found for: EPSTEINBARRV  No results found for: HEPBSAB  No results found for: QUANTIFERON  No results found for: HEPAIGM  No results found for: PPD  No results found for this or any previous visit.         Transplant Infectious Diseases - Candidacy    Assessment/Plan:     Transplant Candidacy: Based on available information, there are no identified significant  barriers to transplantation from an infectious disease standpoint pending acceptable serologies.       1.  Vaccines today: Flu, Tdap, Pneumovax, Hepatitis A series started.  2nd dose of Hep A in 6 months.  Rx given   2.  Quantiferon Gold is pending.  If positive, please consult ID. If  Indeterminate, please draw T spot.  If T spot positive, please consult ID.    3.  RPR, strongyloides, HIV pending.  If positive, please consult ID  4.  Varicella IgG pending.  Please consult ID for vaccine recommendations if negative.        Final determination of transplant candidacy will be made once evaluation is complete and reviewed by the Transplant Selection Committee.    Adali Draper, APRN, ANP         Counseling:I discussed with Korey and her family the risk for increased susceptibility to infections following transplantation including increased risk for infection right after transplant and if rejection should occur.  The patient has been counseled on the importance of vaccinations including but not limited to a yearly flu vaccine.  Specific guidance has been provided to the patient regarding the patients occupation, hobbies and activities to avoid future infectious complications including but not limited to avoiding undercooked meats and seafood, proper hygiene, and contact with animals.

## 2019-04-03 NOTE — PROGRESS NOTES
"PHARM.D. PRE-TRANSPLANT NOTE:    This patient's medication therapy was evaluated as part of her pre-transplant evaluation.    The following pharmacologic concerns were noted: start immediately post transplant - trazodone    This patient's medication profile was reviewed for contraindications for DAA Hepatitis C therapy:    [X]  No current inducers of CYP 3A4 or PGP  [X]  No amiodarone on this patient's EMR profile in the last 24 months  [X]  No past or current atrial fibrillation on this patient's EMR profile       Current Outpatient Medications   Medication Sig Dispense Refill    amLODIPine (NORVASC) 10 MG tablet Take 10 mg by mouth once daily.      BIOTIN ORAL Take 1 tablet by mouth once daily.      calcium acetate (PHOSLYRA) 667 mg (169 mg calcium)/5 mL Soln Take 6 tablets by mouth 3 (three) times daily with meals.      hydrALAZINE (APRESOLINE) 25 MG tablet Take 75 mg by mouth 3 (three) times daily.      insulin aspart U-100 (NOVOLOG U-100 INSULIN ASPART) 100 unit/mL injection Inject 12 Units into the skin 2 (two) times daily.      insulin glargine (LANTUS U-100 INSULIN) 100 unit/mL injection Inject 10 Units into the skin every evening.      losartan (COZAAR) 100 MG tablet Take 100 mg by mouth once daily.      metoclopramide HCl (REGLAN) 10 MG tablet Take 5 mg by mouth 4 (four) times daily before meals and nightly.      metoprolol tartrate (LOPRESSOR) 50 MG tablet Take 75 mg by mouth 2 (two) times daily.      ondansetron (ZOFRAN) 4 MG tablet Take 4 mg by mouth every 12 (twelve) hours as needed for Nausea.      pantoprazole (PROTONIX) 40 MG tablet Take 40 mg by mouth once daily.      pravastatin (PRAVACHOL) 80 MG tablet Take 80 mg by mouth every evening.      SURE COMFORT PEN NEEDLE 32 gauge x 5/32" Ndle       traMADol (ULTRAM) 50 mg tablet Take 50 mg by mouth every 8 (eight) hours as needed for Pain.      traZODone (DESYREL) 100 MG tablet Take 100 mg by mouth every evening.       No current " facility-administered medications for this visit.          Currently MsRachna Alcides is responsible for preparing / administering this patient's medications on a daily basis.  I am available for consultation and can be contacted, as needed by the other members of the Kidney Transplant team.

## 2019-04-03 NOTE — PROGRESS NOTES
INITIAL PATIENT EDUCATION NOTE    Ms. Korey Mcgrath was seen in pre-kidney transplant clinic for evaluation for kidney, kidney/pancreas or pancreas only transplant.  The patient attended a group education session that discussed/reviewed the following aspects of transplantation: evaluation and selection committee process, UNOS waitlist management/multiple listings, types of organs offered (KDPI < 85%, KDPI > 85%, PHS increased risk, DCD, HCV+), financial aspects, surgical procedures, dietary instruction pre- and post-transplant, health maintenance pre- and post-transplant, post-transplant hospitalization and outpatient follow-up, potential to participate in a research protocol, and medication management and side effects.  A question and answer session was provided after the presentation.    The patient was seen by all members of the multi-disciplinary team to include: Nephrologist/PA, Surgeon, , Transplant Coordinator, , Pharmacist and Dietician (if applicable).    The patient reviewed and signed all consents for evaluation which were witnessed and sent to scanning into the EPIC chart.    The patient was given an education book and plan for further evaluation based on her individual assessment.      The patient was encouraged to call with any questions or concerns.

## 2019-04-03 NOTE — PROGRESS NOTES
MYCOPHENOLATE REMS PROGRAM:    Pt states she has had surgery for permanent contraception.  An acknowledgement form was not needed for this patient.

## 2019-04-03 NOTE — PROGRESS NOTES
received Tdap, Flu, Pneumovax, and Hepatitis A(first dose) vaccines. Tolerated well. Left unit in NAD.

## 2019-04-03 NOTE — LETTER
April 4, 2019        Kirit Masterson  [address] [place] [address]  Phone: [phone]  Fax: [phone]             Nathaniel Alexandra- [facility]  [address]  [facility] [address]  Phone: [phone]   Patient: Korey Mcgrath   MR Number: [id number]   YOB: 1980   Date of Visit: 4/3/2019       Dear Dr. Kirit Masterson    Thank you for referring Korey Mcgrath to me for evaluation. Attached you will find relevant portions of my assessment and plan of care.    If you have questions, please do not hesitate to call me. I look forward to following Korey Mcgrath along with you.    Sincerely,    Mallory Burgos NP    Enclosure    If you would like to receive this communication electronically, please contact [email or web address] or [phone] to request [employer] Link access.    [employer] Link is a tool which provides read-only access to select patient information with whom you have a relationship. Its easy to use and provides real time access to review your patients record including encounter summaries, notes, results, and demographic information.    If you feel you have received this communication in error or would no longer like to receive these types of communications, please e-mail [email or web address]

## 2019-04-04 ENCOUNTER — TELEPHONE (OUTPATIENT)
Dept: TRANSPLANT | Facility: CLINIC | Age: 39
End: 2019-04-04

## 2019-04-04 NOTE — TELEPHONE ENCOUNTER
Dialysis Adherence:    Og, nurse at pt's dialysis unit reports over the last three months that patient has had 0 AMAs, 0 no shows and no issues with labs, transportation or caregiver support. Nurse had no concerns or questions.    SWI remains available at [phone].

## 2019-04-08 ENCOUNTER — TELEPHONE (OUTPATIENT)
Dept: TRANSPLANT | Facility: CLINIC | Age: 39
End: 2019-04-08

## 2019-04-08 NOTE — TELEPHONE ENCOUNTER
Nutritional assessment from dialysis unit received and reviewed--no nutritional changes to plan needed at this time.  Pt to continue to follow-up with renal dietitians recommendations.

## 2019-04-10 NOTE — PROGRESS NOTES
Transplant Recipient Adult Psychosocial Assessment    Korey Mcgrath  [address]  [place] [address]  Telephone Information:   Mobile [phone]   Home  [phone] (home)  Work  There is no work phone number on file.  E-mail  No e-mail address on record    Sex: female  YOB: 1980  Age: 38 y.o.    Encounter Date: 4/3/2019  [place] Citizen: yes  Primary Language: English   Needed: no    Emergency Contact:  Name: Shabana Petty  Relationship: niece/caregiver  Address: [place], [place]  Phone Numbers:  n/a (home), n/a (work), [phone] (mobile)    Family/Social Support:   Number of dependents/: two sons, ages 14 and 11 (Sukhdeep Jacobs [phone] and Harinder Jacobs [phone])  Marital history: never , two children  Other family dynamics: Pt reports close, supportive family.  Parents are living a niece and three sisters who are supportive and live nearby.    Household Composition:  Name: Korey Mcgrath  Age: 38  Relationship: patient  Does person drive? yes    Name: Marva Jacobs  Age: 14 and 11  Relationship: sons  Does person drive? no    Do you and your caregivers have access to reliable transportation? yes  PRIMARY CAREGIVER: Clara Petty will be primary caregiver, phone number [phone].      provided in-depth information to patient and caregiver regarding pre- and post-transplant caregiver role.   strongly encourages patient and caregiver to have concrete plan regarding post-transplant care giving, including back-up caregiver(s) to ensure care giving needs are met as needed.    Patient and Caregiver states understanding all aspects of caregiver role/commitment and is able/willing/committed to being caregiver to the fullest extent necessary.    Patient and Caregiver verbalizes understanding of the education provided today and caregiver responsibilities.         remains available. Patient and Caregiver  agree to contact  in a timely manner if concerns arise.      Able to take time off work without financial concerns: yes.     Additional Significant Others who will Assist with Transplant:  Name: Cally Mcgrath [phone]  Age: 67  City: [facility] State: [place]  Relationship: mother  Does person drive? yes    Living Will: no  Healthcare Power of : no  Advance Directives on file: <<no information> per medical record.  Verbally reviewed LW/HCPA information.   provided patient with copy of LW/HCPA documents and provided education on completion of forms.    Living Donors: No. Education and resource information given to patient.    Highest Education Level: High School (9-12) or GED  Reading Ability: 11th grade  Reports difficulty with: N/A  Learns Best By:  Reading and discussing     Status: no  VA Benefits: no     Working for Income: No  If no, reason not working: Disability  Patient is disabled.  Prior to disability, patient  was employed as as a manager of a [employer] and ..    Spouse/Significant Other Employment: Niece manages [employer]; mom retired    Disabled: yes: date disability began: 2018, due to: ESRD.    Monthly Income:   Disability: $979  Food [place]: $265  Able to afford all costs now and if transplanted, including medications: yes  Patient and Caregiver verbalizes understanding of personal responsibilities related to transplant costs and the importance of having a financial plan to ensure that patients transplant costs are fully covered.       provided fundraising information/education. Patient and Caregiververbalizes understanding.   remains available.    Insurance:   Payor/Plan Subscr  Sex Relation Sub. Ins. ID Effective Group Num   1. MEDICAID - [place]* VINCENT MCGRATH 1980 Female  [id number]* 18                                    [address]     Primary Insurance (for UNOS reporting):  Public Insurance - Medicaid  Secondary Insurance (for UNOS reporting): None  Patient and Caregiver verbalizes clear understanding that patient may experience difficulty obtaining and/or be denied insurance coverage post-surgery. This includes and is not limited to disability insurance, life insurance, health insurance, burial insurance, long term care insurance, and other insurances.      Patient and Caregiver also reports understanding that future health concerns related to or unrelated to transplantation may not be covered by patient's insurance.  Resources and information provided and reviewed.     Patient and Caregiver provides verbal permission to release any necessary information to outside resources for patient care and discharge planning.  Resources and information provided are reviewed.      Dialysis Adherence: Patient reports good compliance with dialysis treatment.  See separate note for adherence check.  Infusion Service: patient utilizing? no  Home Health: patient utilizing? no  DME: diabetic equipment and supplies  Pulmonary/Cardiac Rehab: none   ADLS:  Pt states ability to independently accomplish all adls.    Adherence:  Pt states current and expected compliance with all healthcare recommendations.  Adherence education and counseling provided.     Per History Section:  Past Medical History:   Diagnosis Date    Anemia     Diabetes mellitus     Disorder of kidney and ureter     GERD (gastroesophageal reflux disease)     Hyperlipidemia     Hypertension     IDDM (insulin dependent diabetes mellitus)     Obesity     Preeclampsia      Social History     Tobacco Use    Smoking status: Never Smoker    Smokeless tobacco: Never Used   Substance Use Topics    Alcohol use: Never     Frequency: Never     Social History     Substance and Sexual Activity   Drug Use Never     Social History     Substance and Sexual Activity   Sexual Activity Not on file       Per Today's Psychosocial:  Tobacco: none,  patient denies any use.  Alcohol: none, patient denies any use.  Illicit Drugs/Non-prescribed Medications: none, patient denies any use.    Patient and Caregiver states clear understanding of the potential impact of substance use as it relates to transplant candidacy and is aware of possible random substance screening.  Substance abstinence/cessation counseling, education and resources provided and reviewed.     Arrests/DWI/Treatment/Rehab: patient denies    Psychiatric History:    Mental Health: Pt denies mental health concerns  Psychiatrist/Counselor: denies  Medications:   trazadone  Suicide/Homicide Issues: Pt denies current or past suicidal/homicidal ideation.   Safety at home: Pt denies any home safety concerns.    Knowledge: Patient and Caregiver states having clear understanding and realistic expectations regarding the potential risks and potential benefits of organ transplantation and organ donation and agrees to discuss with health care team members and support system members, as well as to utilize available resources and express questions and/or concerns in order to further facilitate the pt informed decision-making.  Resources and information provided and reviewed.    Patient and Caregiver is aware of [facility]'s affiliation and/or partnership with agencies in home health care, LTAC, SNF, [facility], and other hospitals and clinics.    Understanding: Patient and Caregiver reports having a clear understanding of the many lifetime commitments involved with being a transplant recipient, including costs, compliance, medications, lab work, procedures, appointments, concrete and financial planning, preparedness, timely and appropriate communication of concerns, abstinence (ETOH, tobacco, illicit non-prescribed drugs), adherence to all health care team recommendations, support system and caregiver involvement, appropriate and timely resource utilization and follow-through, mental health counseling as needed/recommended,  and patient and caregiver responsibilities.  Social Service Handbook, resources and detailed educational information provided and reviewed.  Educational information provided.    Patient and Caregiver also reports current and expected compliance with health care regime and states having a clear understanding of the importance of compliance.      Patient and Caregiver reports a clear understanding that risks and benefits may be involved with organ transplantation and with organ donation.       Patient and Caregiver also reports clear understanding that psychosocial risk factors may affect patient, and include but are not limited to feelings of depression, generalized anxiety, anxiety regarding dependence on others, post traumatic stress disorder, feelings of guilt and other emotional and/or mental concerns, and/or exacerbation of existing mental health concerns.  Detailed resources provided and discussed.      Patient and Caregiver agrees to access appropriate resources in a timely manner as needed and/or as recommended, and to communicate concerns appropriately.  Patient and Caregiver also reports a clear understanding of treatment options available.     Patient and Caregiver received education in a group setting.   reviewed education, provided additional information, and answered questions.    Feelings or Concerns: denies    Coping: Pt stated she utilizes sleeping and spending time with family as methods of coping.    Goals: Get off dialysis and return to work.  Patient referred to Vocational Rehabilitation.    Interview Behavior: Patient and Caregiver presents as alert and oriented x 4, pleasant, good eye contact, well groomed, recall good, concentration/judgement good, average intelligence, calm, communicative, cooperative and asking and answering questions appropriately. She was accompanied by her niece who presented as supportive of pt's pursuit of transplant.         Transplant Social Work -  Candidacy  Assessment/Plan:     Psychosocial Suitability: Patient presents as an acceptable candidate for kidney transplant at this time. Based on psychosocial risk factors, patient presents as low risk, due to absence of significant psychosocial risk factors..    Recommendations/Additional Comments: Recommend fundraising and will benefit from [facility] [address] post transplant.    Rehana Wright LCSW

## 2019-06-27 ENCOUNTER — TELEPHONE (OUTPATIENT)
Dept: TRANSPLANT | Facility: CLINIC | Age: 39
End: 2019-06-27

## 2019-06-27 NOTE — TELEPHONE ENCOUNTER
Patient will be presented to committee on 7/5/19. All questions were answered and patient verbalized understanding

## 2019-06-27 NOTE — TELEPHONE ENCOUNTER
----- Message from Nichelle Oviedo sent at 6/27/2019  9:53 AM CDT -----  Contact: Patient      Reason for call: Would like to know the states of the eveuation        Communication Preference: [phone]    Additional Information:

## 2019-07-05 ENCOUNTER — COMMITTEE REVIEW (OUTPATIENT)
Dept: TRANSPLANT | Facility: CLINIC | Age: 39
End: 2019-07-05

## 2019-07-05 DIAGNOSIS — Z76.82 ORGAN TRANSPLANT CANDIDATE: Primary | ICD-10-CM

## 2019-07-05 NOTE — LETTER
July 5, 2019    Kirit Masterson MD  [address] [place] [address]       Dear Dr. Masterson:    Patient: Korey Mcgrath   MR Number: [id number]   YOB: 1980     Your patient, Korey Mcgrath, was recently discussed at the [facility] Selection Committee meeting on 7/5/2019. I am happy to inform you that Korey has been approved for transplantation.  She has met selection criteria for a kidney transplant related to ESRD secondary to primary diagnosis of Diabetes Mellitus - Type Other / Unknown. Your patient will be placed on the cadaveric wait list pending final financial approval from insurance company.     We appreciate your confidence in allowing us to participate in your patients care.  If you have any questions or concerns, please do not hesitate to contact me.    Sincerely,      Yvonne Whitlock MD  Medical Director, Kidney & Kidney/Pancreas Transplantation  lh  Copy to patient  Faxed to:  Dr. Kirit Masterson                   [facility]

## 2019-07-05 NOTE — COMMITTEE REVIEW
Native Organ Dx: Diabetes Mellitus - Type Other / Unknown      SELECTION COMMITTEE NOTE    Korey Mcgrath was presented at selection committee on 7/5/2019.  Patient met selection criteria for kidney transplant related to ESRD due to   Diabetes Mellitus - Type Other / Unknown.  No absolute contraindications to transplant at this time.  Patient will be placed on the cadaveric wait list pending final financial approval from insurance company.  Patient will return to clinic for routine appointment in 1 year(s). Patient does not meet criteria for High KDPI kidney offer. Patient does not meet HCV+ kidney offer, pt declined.  Encourage weight loss.      Committee's decison discussed with patient. All questions were answered and patient verbalized understanding.       Note written by Meagan Snowden RN    ===============================================    I was present at the meeting and attest to the decision of the committee.    Savannah Stewart MD

## 2019-07-12 ENCOUNTER — LAB VISIT (OUTPATIENT)
Dept: LAB | Facility: HOSPITAL | Age: 39
End: 2019-07-12
Attending: INTERNAL MEDICINE
Payer: MEDICAID

## 2019-07-12 DIAGNOSIS — Z76.82 ORGAN TRANSPLANT CANDIDATE: ICD-10-CM

## 2019-07-12 LAB — ABO + RH BLD: NORMAL

## 2019-07-12 PROCEDURE — 86901 BLOOD TYPING SEROLOGIC RH(D): CPT | Mod: TXP

## 2019-07-12 PROCEDURE — 36415 COLL VENOUS BLD VENIPUNCTURE: CPT | Mod: TXP

## 2019-07-12 PROCEDURE — 86833 HLA CLASS II HIGH DEFIN QUAL: CPT | Mod: TXP

## 2019-07-12 PROCEDURE — 86832 HLA CLASS I HIGH DEFIN QUAL: CPT | Mod: TXP

## 2019-07-17 ENCOUNTER — TELEPHONE (OUTPATIENT)
Dept: TRANSPLANT | Facility: CLINIC | Age: 39
End: 2019-07-17

## 2019-07-17 DIAGNOSIS — Z76.82 AWAITING TRANSPLANTATION OF KIDNEY: Primary | ICD-10-CM

## 2019-07-17 NOTE — TELEPHONE ENCOUNTER
"  KIDNEY WAIT LISTING NOTE    Date of Financial clearance to list: 19    [facility]:     Organ: Kidney  Name:       Korey Mcgrath   : 1980          Gender:     female    MRN#: [id number]                                 State of Permanent Residence:  [address] [place] [address]  Ethnicity: /Black   Race:      Black or     CLINICAL INFORMATION   Candidate Medical Urgency Status: Active  Number of Previous Kidney Transplants: 0  Number of Previous Solid Organ Transplants: 0  Did you enter number of previous kidney or other solid organ transplants? yes  Is this Candidate a Prior Living Donor: no  (If yes, please generate letter to UNOS with patient's date of donation, recipient SSN, signed by Surgical Director after patient is listed in order to receive priority points).      ABO  ABO Blood Group:   O POS     ABO Confirmation: (THESE DATES MUST BE PRIOR TO THE LIST DATE AND SUPPORTED BY SEPARATE LAB REPORTS)    Internal Results    Lab Results   Component Value Date    GROUPTRH O POS 2019    GROUPTRH O POS 2019     No results found for: ABO    External Results    ABO Date 1:    ABO Date 2  Are either of these ABO results based on External Labs? no  (If Yes, STOP and go to source document in Media Tab for verification).    VITALS  Height:  5' 4"  Weight:  223 lbs  (Use height from Transplant clinic visits only).  Did you enter height/weight? Yes    HLA    Class I:  Lab Results   Component Value Date    HTHL1HM 2 2019    FQWG4JD 31 2019    RPFO5UG 18 2019    ZVUV2NR 39 2019    JTZEA8BS 6 2019    NRJXS6GT XX 2019    CPLIA8CX 5 2019    FWOWU0YX 7 2019       Class II:  Lab Results   Component Value Date    TXWAHV10HV 17 2019    YUPINW15AC 4 2019    PAFBCJ231OM 52 2019    [id number] 53 2019    YWISK3JF 2 2019    PJHCE8TJ 8 2019       Tested for HLA Antibodies: " "Yes, no antibodies detected     If result is "Positive" antibodies are detected     If result is "Negative or questionable" no antibodies detected    Lab Results   Component Value Date    CIPRAS Negative 04/03/2019    CIIPRAS Negative 04/03/2019       DIALYSIS INFORMATION  Is patient Pre-Dialysis: No     Report GFR being used as the criteria for placement on the kidney list. If not, leave blank  GFR < or = 20 ml/min? n/a  If Yes, Specify value  ___   ml/min     Initial date GFR became 20 or less:   Is GFR obtained from an Outside lab Result? n/a  (If YES verify with source document scanned into media)    If patient on Dialysis:    Is candidate currently on dialysis for ESRD? Yes  If Yes,  Date Chronic Dialysis Started:   11/1/2018  (verify with source document in Media Tab)   Dialysis Unit Name: [address] [place] [address]                        Physician Name:  Dr. Yvonne Andre  [facility]#: [id number]    DIABETES INFORMATION  Primary Native Kidney Diagnosis: Diabetes Mellitus - Type Other / Unknown  C-Peptide Value - No results found for: CPEPTIDE  Current Diabetes Status: Type II    FOR NON-KIDNEY DEPARTMENT USE ONLY:  Additional Organs Registered? none    Maximum Acceptable Number of HLA Mismatches  ABDR:     6      (0-6)               AB:               (0-4)  ADR:   _____  (0-4)              BDR: _____ (0-4)  A:        _____  (0-2)              B:      _____ (0-2)          DR: ______ (0-2)    Will Recipient Accept?   Accept HBcAB Positive Organ:            Yes  Accept HBV KARINE Positive Organ:        no  Accept HCV Antibody Positive Organ: no   Accept HCV KARINE Positive Organ: no  Accept KDPI > 85 Donor ?: No                        Local: No                           Import: No    ### NURSE TO VERIFY CONSENT AND MAKE ANY NECESSARY CHANGES NEEDED IN UNET AT THE TIME OF VERIFICATION ###    Unacceptible Antigens  If yes, list     No results found for: BB3VZIX, CIABCLM, CIIAB, " ABCMT    ### DO NOT LIST IF ANTIGEN VALUE WEAK ###

## 2019-07-17 NOTE — LETTER
2019     Korey Mcgrath  [address] [place] [address]    Dear Korey Mcgrath:  MRN: [id number]    Congratulations! On 2019, you were placed on  the waiting list for a  donor transplant.    Your candidacy for kidney transplant is based on the following criteria: ESRD due to Diabetes Mellitus - Type Other / Unknown.    Your transplant coordinator while on the waiting list is Josey Pitts RN. They can be reached at [phone] or [phone] with any questions.      What to do now?    ? Ask your living donors to call and begin testing   Give your donors our phone number, [phone]   Make sure your donors have your name and date of birth when they call   You will get transplanted much faster if you have a living donor    ? Have your blood sent to our Transplant Lab every month   If you are on dialysis - our Transplant Lab will work with your dialysis unit to send your blood every month   If you are not on dialysis   - If you live near an [facility] lab, we will schedule you to have blood drawn every month  - If you do not live near an [facility] lab, you will be sent blood kits in the mail. You will need to take a kit to your local lab or doctor to have your blood drawn every month and mail to the Transplant Lab.     ? Call us with ANY CHANGES   Phone numbers - we must be able to reach you anytime of the day or night when a kidney is available   Address   Insurance coverage   Dialysis unit or kidney doctor   Leonard: if you have surgery, stay in the hospital, have to get blood, or have an infection    ? Review your Kidney/Kidney-Pancreas Transplant Guide    This will give you detailed information about what happens when  - you are on the waiting list   - you are called when a kidney is available     The [facility] has a transplant surgeon and physician available 365  days a year, 24 hours a day to coordinate organ acceptance,  procurement, surgical placement and to  address urgent patient care issues.  You will be notified in writing of any changes to our Transplant  Centers staffing plan that would impact your ability to receive a transplant.     Attached is a letter from the United Network for Organ Sharing (UNOS). It describes the services and  information offered to patients by UNOS and the Organ Procurement and Transplant Network. We look  forward to working with you while on the waiting list.      Congratulations,     Your Transplant Partner   [facility]    [address] [place] [id number]   [phone]      Faxed to:  Kirit Masterson MD                               [facility]              OPTN/UNOS: Your Resource for Organ Transplant Information        If you have a question regarding your own medical care, you always should call your transplant center first. However, for general organ transplant-related information, you can call the United Network for Organ Sharing (UNOS) toll-free patient services line at 1-122.866.8973.    Anyone, including potential transplant candidates, recipients, family members/friends, living donors, and/or donor family members can call this number to:    · talk about organ donation, living donation, how transplant and donation work, the donation process, transplant policies, and transplant/donor information;  · get a free patient information kit with helpful booklets, waiting list and transplant information, and a list of all transplant centers;  · ask questions about the Organ Procurement and Transplantation Network (OPTN) web site (www.optn.transplant.hrsa.gov); the UNOS Web site (www.unos.org); or the UNOS web site for living donors and transplant recipients (www.transplantliving.org);  · learn how UNOS and the OPTN can help you;  · talk about any concerns that you may have with a transplant center and how they perform    UNOS is a  not-for-profit organization that provides all of the administrative services for the national OPTN under federal contract to the Health Resources and Services Administration (HRSA), an agency under the U.S. Department of Health and Human Services (HHS).     UNOS and OPTN responsibilities include:    · writing educational material for patients, the public and professionals;  · helping to make people aware of the need for donated organs and tissue;  · writing organ transplant policy with help from doctors, nurses, transplant patients/candidates, donor families, living donors, and the public;  · coordinating the organ matching and placement process;  · collecting information about every organ transplant and donation that occurs in the [place].    Remember, you should contact your transplant center directly if you have questions or concerns about your own medical care including medical records, work-up progress and test reports. [facility] is not your transplant center, and staff at [facility] will not be able to transfer you to your transplant center, so keep your transplant centers phone number handy. But, while you research your transplant needs and learn as much as you can about transplantation and donation, we welcome your call to our toll-free patient services line at [phone].

## 2019-07-22 LAB — HPRA INTERPRETATION: NORMAL

## 2019-07-25 DIAGNOSIS — Z76.82 ORGAN TRANSPLANT CANDIDATE: Primary | ICD-10-CM

## 2019-07-31 LAB
CLASS I ANTIBODIES - LUMINEX: NORMAL
CLASS II ANTIBODY COMMENTS - LUMINEX: NORMAL
CPRA %: 3
SERUM COLLECTION DT - LUMINEX CLASS I: NORMAL
SERUM COLLECTION DT - LUMINEX CLASS II: NORMAL
SPCL1 TESTING DATE: NORMAL
SPCL2 TESTING DATE: NORMAL
SPCLU TESTING DATE: NORMAL

## 2019-08-23 ENCOUNTER — HISTORICAL (OUTPATIENT)
Dept: ADMINISTRATIVE | Facility: HOSPITAL | Age: 39
End: 2019-08-23

## 2019-08-23 LAB
ABS NEUT (OLG): 7.17 X10(3)/MCL (ref 2.1–9.2)
ALBUMIN SERPL-MCNC: 3.7 GM/DL (ref 3.4–5)
ALBUMIN/GLOB SERPL: 0.9 RATIO (ref 1.1–2)
ALP SERPL-CCNC: 114 UNIT/L (ref 45–117)
ALT SERPL-CCNC: 15 UNIT/L (ref 12–78)
AST SERPL-CCNC: 10 UNIT/L (ref 15–37)
BASOPHILS # BLD AUTO: 0.07 X10(3)/MCL
BASOPHILS NFR BLD AUTO: 1 %
BILIRUB SERPL-MCNC: 0.4 MG/DL (ref 0.2–1)
BILIRUBIN DIRECT+TOT PNL SERPL-MCNC: 0.1 MG/DL
BILIRUBIN DIRECT+TOT PNL SERPL-MCNC: 0.3 MG/DL
BUN SERPL-MCNC: 35 MG/DL (ref 7–18)
CALCIUM SERPL-MCNC: 8.2 MG/DL (ref 8.5–10.1)
CHLORIDE SERPL-SCNC: 95 MMOL/L (ref 98–107)
CO2 SERPL-SCNC: 30 MMOL/L (ref 21–32)
CREAT SERPL-MCNC: 5.6 MG/DL (ref 0.6–1.3)
EOSINOPHIL # BLD AUTO: 0.22 10*3/UL
EOSINOPHIL NFR BLD AUTO: 2 %
ERYTHROCYTE [DISTWIDTH] IN BLOOD BY AUTOMATED COUNT: 14 % (ref 11.5–14.5)
EST. AVERAGE GLUCOSE BLD GHB EST-MCNC: 214 MG/DL
GLOBULIN SER-MCNC: 3.9 GM/ML (ref 2.3–3.5)
GLUCOSE SERPL-MCNC: 270 MG/DL (ref 74–106)
HBA1C MFR BLD: 9.1 % (ref 4.2–6.3)
HCT VFR BLD AUTO: 36.1 % (ref 35–46)
HGB BLD-MCNC: 11.8 GM/DL (ref 12–16)
IMM GRANULOCYTES # BLD AUTO: 0.03 10*3/UL
IMM GRANULOCYTES NFR BLD AUTO: 0 %
LYMPHOCYTES # BLD AUTO: 1.65 X10(3)/MCL
LYMPHOCYTES NFR BLD AUTO: 17 % (ref 13–40)
MCH RBC QN AUTO: 31.9 PG (ref 26–34)
MCHC RBC AUTO-ENTMCNC: 32.7 GM/DL (ref 31–37)
MCV RBC AUTO: 97.6 FL (ref 80–100)
MONOCYTES # BLD AUTO: 0.67 X10(3)/MCL
MONOCYTES NFR BLD AUTO: 7 % (ref 0–24)
NEUTROPHILS # BLD AUTO: 7.17 X10(3)/MCL
NEUTROPHILS NFR BLD AUTO: 73 X10(3)/MCL
PLATELET # BLD AUTO: 256 X10(3)/MCL (ref 130–400)
PMV BLD AUTO: 10.6 FL (ref 7.4–10.4)
POTASSIUM SERPL-SCNC: 3.5 MMOL/L (ref 3.5–5.1)
PROT SERPL-MCNC: 7.6 GM/DL (ref 6.4–8.2)
RBC # BLD AUTO: 3.7 X10(6)/MCL (ref 4–5.2)
SODIUM SERPL-SCNC: 136 MMOL/L (ref 136–145)
TSH SERPL-ACNC: 2.54 MIU/L (ref 0.36–3.74)
WBC # SPEC AUTO: 9.8 X10(3)/MCL (ref 4.5–11)

## 2019-10-23 ENCOUNTER — HISTORICAL (OUTPATIENT)
Dept: ADMINISTRATIVE | Facility: HOSPITAL | Age: 39
End: 2019-10-23

## 2019-10-23 LAB
ABS NEUT (OLG): 6.25 X10(3)/MCL (ref 2.1–9.2)
ALBUMIN SERPL-MCNC: 3.7 GM/DL (ref 3.4–5)
ALBUMIN/GLOB SERPL: 0.9 RATIO (ref 1.1–2)
ALP SERPL-CCNC: 125 UNIT/L (ref 45–117)
ALT SERPL-CCNC: 15 UNIT/L (ref 12–78)
AST SERPL-CCNC: 10 UNIT/L (ref 15–37)
BASOPHILS # BLD AUTO: 0.1 X10(3)/MCL (ref 0–0.2)
BASOPHILS NFR BLD AUTO: 1 %
BILIRUB SERPL-MCNC: 0.6 MG/DL (ref 0.2–1)
BILIRUBIN DIRECT+TOT PNL SERPL-MCNC: 0.2 MG/DL (ref 0–0.2)
BILIRUBIN DIRECT+TOT PNL SERPL-MCNC: 0.4 MG/DL
BUN SERPL-MCNC: 42 MG/DL (ref 7–18)
CALCIUM SERPL-MCNC: 9.4 MG/DL (ref 8.5–10.1)
CHLORIDE SERPL-SCNC: 96 MMOL/L (ref 98–107)
CO2 SERPL-SCNC: 30 MMOL/L (ref 21–32)
CREAT SERPL-MCNC: 6 MG/DL (ref 0.6–1.3)
EOSINOPHIL # BLD AUTO: 0.1 X10(3)/MCL (ref 0–0.9)
EOSINOPHIL NFR BLD AUTO: 1 %
ERYTHROCYTE [DISTWIDTH] IN BLOOD BY AUTOMATED COUNT: 13.7 % (ref 11.5–14.5)
EST. AVERAGE GLUCOSE BLD GHB EST-MCNC: 258 MG/DL
GLOBULIN SER-MCNC: 3.9 GM/ML (ref 2.3–3.5)
GLUCOSE SERPL-MCNC: 436 MG/DL (ref 74–106)
HBA1C MFR BLD: 10.6 % (ref 4.2–6.3)
HCT VFR BLD AUTO: 36.9 % (ref 35–46)
HGB BLD-MCNC: 11.6 GM/DL (ref 12–16)
IMM GRANULOCYTES # BLD AUTO: 0.01 10*3/UL
IMM GRANULOCYTES NFR BLD AUTO: 0 %
LYMPHOCYTES # BLD AUTO: 1.4 X10(3)/MCL (ref 0.6–4.6)
LYMPHOCYTES NFR BLD AUTO: 17 %
MCH RBC QN AUTO: 31.4 PG (ref 26–34)
MCHC RBC AUTO-ENTMCNC: 31.4 GM/DL (ref 31–37)
MCV RBC AUTO: 100 FL (ref 80–100)
MONOCYTES # BLD AUTO: 0.5 X10(3)/MCL (ref 0.1–1.3)
MONOCYTES NFR BLD AUTO: 6 %
NEUTROPHILS # BLD AUTO: 6.25 X10(3)/MCL (ref 2.1–9.2)
NEUTROPHILS NFR BLD AUTO: 75 %
PLATELET # BLD AUTO: 229 X10(3)/MCL (ref 130–400)
PMV BLD AUTO: 11.3 FL (ref 7.4–10.4)
POTASSIUM SERPL-SCNC: 3.9 MMOL/L (ref 3.5–5.1)
PROT SERPL-MCNC: 7.6 GM/DL (ref 6.4–8.2)
RBC # BLD AUTO: 3.69 X10(6)/MCL (ref 4–5.2)
SODIUM SERPL-SCNC: 134 MMOL/L (ref 136–145)
WBC # SPEC AUTO: 8.4 X10(3)/MCL (ref 4.5–11)

## 2019-11-04 ENCOUNTER — HISTORICAL (OUTPATIENT)
Dept: RADIOLOGY | Facility: HOSPITAL | Age: 39
End: 2019-11-04

## 2020-01-30 DIAGNOSIS — Z76.82 ORGAN TRANSPLANT CANDIDATE: Primary | ICD-10-CM

## 2020-02-06 ENCOUNTER — TELEPHONE (OUTPATIENT)
Dept: TRANSPLANT | Facility: CLINIC | Age: 40
End: 2020-02-06

## 2020-02-06 DIAGNOSIS — Z76.82 ORGAN TRANSPLANT CANDIDATE: Primary | ICD-10-CM

## 2020-03-06 ENCOUNTER — HISTORICAL (OUTPATIENT)
Dept: ADMINISTRATIVE | Facility: HOSPITAL | Age: 40
End: 2020-03-06

## 2020-03-06 LAB
ABS NEUT (OLG): 7.51 X10(3)/MCL (ref 2.1–9.2)
ALBUMIN SERPL-MCNC: 3.7 GM/DL (ref 3.4–5)
ALBUMIN/GLOB SERPL: 1 RATIO (ref 1.1–2)
ALP SERPL-CCNC: 173 UNIT/L (ref 45–117)
ALT SERPL-CCNC: 19 UNIT/L (ref 12–78)
AST SERPL-CCNC: 17 UNIT/L (ref 15–37)
BASOPHILS # BLD AUTO: 0.1 X10(3)/MCL (ref 0–0.2)
BASOPHILS NFR BLD AUTO: 1 %
BILIRUB SERPL-MCNC: 0.5 MG/DL (ref 0.2–1)
BILIRUBIN DIRECT+TOT PNL SERPL-MCNC: 0.2 MG/DL (ref 0–0.2)
BILIRUBIN DIRECT+TOT PNL SERPL-MCNC: 0.3 MG/DL
BUN SERPL-MCNC: 42 MG/DL (ref 7–18)
CALCIUM SERPL-MCNC: 8.5 MG/DL (ref 8.5–10.1)
CHLORIDE SERPL-SCNC: 90 MMOL/L (ref 98–107)
CHOLEST SERPL-MCNC: 159 MG/DL
CHOLEST/HDLC SERPL: 3 {RATIO} (ref 0–4.4)
CO2 SERPL-SCNC: 27 MMOL/L (ref 21–32)
CREAT SERPL-MCNC: 5.2 MG/DL (ref 0.6–1.3)
EOSINOPHIL # BLD AUTO: 0.1 X10(3)/MCL (ref 0–0.9)
EOSINOPHIL NFR BLD AUTO: 2 %
ERYTHROCYTE [DISTWIDTH] IN BLOOD BY AUTOMATED COUNT: 14.2 % (ref 11.5–14.5)
EST. AVERAGE GLUCOSE BLD GHB EST-MCNC: 301 MG/DL
GLOBULIN SER-MCNC: 3.7 GM/ML (ref 2.3–3.5)
GLUCOSE SERPL-MCNC: 649 MG/DL (ref 74–106)
HBA1C MFR BLD: 12.1 % (ref 4.2–6.3)
HCT VFR BLD AUTO: 34.4 % (ref 35–46)
HDLC SERPL-MCNC: 53 MG/DL (ref 40–59)
HGB BLD-MCNC: 10.9 GM/DL (ref 12–16)
IMM GRANULOCYTES # BLD AUTO: 0.02 10*3/UL
IMM GRANULOCYTES NFR BLD AUTO: 0 %
LDLC SERPL CALC-MCNC: 84 MG/DL
LYMPHOCYTES # BLD AUTO: 1.2 X10(3)/MCL (ref 0.6–4.6)
LYMPHOCYTES NFR BLD AUTO: 12 %
MCH RBC QN AUTO: 32.1 PG (ref 26–34)
MCHC RBC AUTO-ENTMCNC: 31.7 GM/DL (ref 31–37)
MCV RBC AUTO: 101.2 FL (ref 80–100)
MONOCYTES # BLD AUTO: 0.5 X10(3)/MCL (ref 0.1–1.3)
MONOCYTES NFR BLD AUTO: 5 %
NEUTROPHILS # BLD AUTO: 7.51 X10(3)/MCL (ref 2.1–9.2)
NEUTROPHILS NFR BLD AUTO: 80 %
PLATELET # BLD AUTO: 234 X10(3)/MCL (ref 130–400)
PMV BLD AUTO: 11.6 FL (ref 7.4–10.4)
POTASSIUM SERPL-SCNC: 4.3 MMOL/L (ref 3.5–5.1)
PROT SERPL-MCNC: 7.4 GM/DL (ref 6.4–8.2)
RBC # BLD AUTO: 3.4 X10(6)/MCL (ref 4–5.2)
SODIUM SERPL-SCNC: 127 MMOL/L (ref 136–145)
TRIGL SERPL-MCNC: 112 MG/DL
TSH SERPL-ACNC: 3.26 MIU/L (ref 0.36–3.74)
VLDLC SERPL CALC-MCNC: 22 MG/DL
WBC # SPEC AUTO: 9.4 X10(3)/MCL (ref 4.5–11)

## 2020-04-29 ENCOUNTER — HISTORICAL (OUTPATIENT)
Dept: ADMINISTRATIVE | Facility: HOSPITAL | Age: 40
End: 2020-04-29

## 2020-04-29 LAB
BUN SERPL-MCNC: 53 MG/DL (ref 7–18)
CALCIUM SERPL-MCNC: 9.5 MG/DL (ref 8.5–10.1)
CHLORIDE SERPL-SCNC: 95 MMOL/L (ref 98–107)
CO2 SERPL-SCNC: 26 MMOL/L (ref 21–32)
CREAT SERPL-MCNC: 5.7 MG/DL (ref 0.6–1.3)
CREAT/UREA NIT SERPL: 9
DEPRECATED CALCIDIOL+CALCIFEROL SERPL-MC: 39.9 NG/ML (ref 30–80)
GLUCOSE SERPL-MCNC: 419 MG/DL (ref 74–106)
POTASSIUM SERPL-SCNC: 4.6 MMOL/L (ref 3.5–5.1)
SODIUM SERPL-SCNC: 133 MMOL/L (ref 136–145)

## 2020-04-29 NOTE — PROGRESS NOTES
Returned pt's call. Pt states she had pap smear & mmg completed 10/2019 at [facility], [phone], medical records [phone]. Records requested.

## 2020-04-30 PROBLEM — Z76.82 PATIENT ON WAITING LIST FOR KIDNEY TRANSPLANT: Chronic | Status: ACTIVE | Noted: 2020-04-30

## 2020-04-30 NOTE — PROGRESS NOTES
"   Kidney Transplant Recipient Reevalulation    Referring Physician: Kirit Masterson  Current Nephrologist: Kirit Masterson  Waitlist Status: active  Dialysis Start Date: 11/1/2018    Subjective:     CC:  Annual reassessment of kidney transplant candidacy.  The patient location is:  home   The chief complaint leading to consultation is: Annual reassessment of kidney transplant candidacy.  Visit type: audiovisual  Total time spent with patient: 25 minutes   Each patient to whom he or she provides medical services by telemedicine is:  (1) informed of the relationship between the physician and patient and the respective role of any other health care provider with respect to management of the patient; and (2) notified that he or she may decline to receive medical services by telemedicine and may withdraw from such care at any time.    Notes:      HPI:  Ms. Mcgrath is a 39 y.o. year old Black or  female with ESRD secondary to diabetic nephropathy. PMH  IDDM since age 15.  She has been on the wait list for a kidney transplant at [facility] since 11/1/2018. Patient is currently on hemodialysis started on 11/1/2018. Patient is dialyzing on TTS schedule.  Patient reports that she is tolerating dialysis well.  She dialyzes  for 4 hours per session. Dry weight 103.5 KG .  BP remains stable,  She has a RUE AV graft and no longer using the  LUE AV fistula . Patient denies any recent hospitalizations or ED visits.  Reports getting Phos binder dose changed and level has improved.   "all labs have smiley faces !" Over all feels well.   fx assessment    Stays active, rides her bike and will walk.   Discussed weight loss  Pt reports dry weight 103.5 kg  Encouraged to lose ~ 10-15 lbs.       Past Medical History:   Diagnosis Date    Anemia     Diabetes mellitus     Disorder of kidney and ureter     GERD (gastroesophageal reflux disease)     Hyperlipidemia     Hypertension     IDDM (insulin dependent diabetes mellitus) "     Obesity     Preeclampsia        Review of Systems   Constitutional: Positive for appetite change and fatigue. Negative for activity change, chills, fever and unexpected weight change.   HENT: Negative for congestion, facial swelling, postnasal drip, rhinorrhea, sinus pressure, sore throat and trouble swallowing.    Eyes: Positive for visual disturbance. Negative for pain and redness.        Wears glasses   Respiratory: Negative for cough, chest tightness, shortness of breath and wheezing.    Cardiovascular: Negative.  Negative for chest pain, palpitations and leg swelling.   Gastrointestinal: Negative for abdominal pain, diarrhea, nausea and vomiting.   Genitourinary: Positive for decreased urine volume. Negative for dysuria, flank pain and urgency.        Urinates ~ 3x daily   Musculoskeletal: Negative for gait problem, neck pain and neck stiffness.        LUE AV fistula--no longer using   RUE AV graft   Skin: Negative for rash.   Allergic/Immunologic: Negative for environmental allergies, food allergies and immunocompromised state.   Neurological: Negative for dizziness, weakness, light-headedness and headaches.   Psychiatric/Behavioral: Negative for agitation and confusion. The patient is not nervous/anxious.        Objective:   body mass index is unknown because there is no height or weight on file.  4/4/2019   weight 101.6 kg (223 lb 15.8 oz), body mass index is 37.82 kg/m².        Labs:  Lab Results   Component Value Date    WBC 9.22 04/03/2019    HGB 11.4 (L) 04/03/2019    HCT 36.7 (L) 04/03/2019     04/03/2019    K 3.6 04/03/2019    CL 97 04/03/2019    CO2 28 04/03/2019    BUN 28 (H) 04/03/2019    CREATININE 4.4 (H) 04/03/2019    EGFRNONAA 11.9 (A) 04/03/2019    CALCIUM 9.6 04/03/2019    PHOS 2.6 (L) 04/03/2019    ALBUMIN 3.9 04/03/2019    AST 13 04/03/2019    ALT <5 (L) 04/03/2019    .0 (H) 04/03/2019       No results found for: PREALBUMIN, BILIRUBINUA, GGT, AMYLASE, LIPASE, PROTEINUA,  NITRITE, RBCUA, WBCUA    No results found for: HLAABCTYPE    Lab Results   Component Value Date    CPRA 5 03/12/2020    NQ8VWQG A25,B37 03/12/2020    CIABCLM A66-- WEAK A43 03/12/2020    ABCMT DP1, DP5 03/12/2020       Labs were reviewed with the patient.    Pre-transplant Workup:   Reviewed with the patient.    Assessment:     1. ESRD (end stage renal disease)    2. Patient on waiting list for kidney transplant    3. IDDM (insulin dependent diabetes mellitus)    4. BMI 37.0-37.9, adult        Plan:   CXR, 2 D Echo scheduled on 7/1/2020  UTD weight from dialysis center   last weight on file:   4/4/2019   weight 101.6 kg (223 lb 15.8 oz), body mass index is 37.82 kg/m².  Encouraged ~ 10-15 lbs weight loss     Transplant Candidacy:   Ms. Mcgrath is a suitable kidney transplant candidate.  Meets center eligibility for accepting HCV+ donor offer - yes.  Patient educated on HCV+ donors. Korey is willing  to accept HCV+ donor offer -  no pt declined    Patient is a candidate for KDPI > 85 kidney donor offer - no d/t age and weight .  She remains in overall stable health, and will remain active on the transplant list.    Mallory Burgos NP       Follow-up:   In addition to the tests noted in the plan, Ms. Mcgrath will continue to have reevaluation as per the standing pre-kidney transplant protocol:  1. Monthly blood for PRA  2. Annual return to clinic, except HIV positive, > 65 years of age, or pancreas transplant candidates who will be scheduled to see transplant every 6 months while in pre-transplant phase  3. Annual re-testing: CXR, EKG, yearly mammograms for women over 40 and PSA for males over 40, cardiology follow-up as recommended by initial cardiology pre-transplant evaluation  4. Renal ultrasound every 2 years  5. Baseline colonoscopy after age 50 and repeated as recommended    UNOS Patient Status  Functional Status: 60% - Requires occasional assistance but is able to care for needs  Physical Capacity: No  Limitations

## 2020-05-01 ENCOUNTER — OFFICE VISIT (OUTPATIENT)
Dept: TRANSPLANT | Facility: CLINIC | Age: 40
End: 2020-05-01
Payer: MEDICAID

## 2020-05-01 DIAGNOSIS — Z76.82 PATIENT ON WAITING LIST FOR KIDNEY TRANSPLANT: Chronic | ICD-10-CM

## 2020-05-01 DIAGNOSIS — N18.6 ESRD (END STAGE RENAL DISEASE): Primary | ICD-10-CM

## 2020-05-01 PROCEDURE — 99213 OFFICE O/P EST LOW 20 MIN: CPT | Mod: 95,TXP,, | Performed by: NURSE PRACTITIONER

## 2020-05-01 PROCEDURE — 99213 PR OFFICE/OUTPT VISIT, EST, LEVL III, 20-29 MIN: ICD-10-PCS | Mod: 95,TXP,, | Performed by: NURSE PRACTITIONER

## 2020-05-01 RX ORDER — DOXAZOSIN 2 MG/1
2 TABLET ORAL DAILY
COMMUNITY
Start: 2020-03-06 | End: 2022-12-30

## 2020-05-01 RX ORDER — FUROSEMIDE 40 MG/1
40 TABLET ORAL DAILY
COMMUNITY
Start: 2020-04-23 | End: 2022-12-30

## 2020-05-01 RX ORDER — SUCROFERRIC OXYHYDROXIDE 500 MG/1
3000 TABLET, CHEWABLE ORAL
COMMUNITY
Start: 2020-04-17

## 2020-05-01 NOTE — LETTER
May 1, 2020        Kirit Masterson  [address] [place] [address]  Phone: [phone]  Fax: [phone]             [facility]  [address]  [facility] [address]  Phone: [phone]   Patient: Korey Mcgrath   MR Number: [id number]   YOB: 1980   Date of Visit: 5/1/2020       Dear Dr. Kirit Masterson    Thank you for referring Korey Mcgrath to me for evaluation. Attached you will find relevant portions of my assessment and plan of care.    If you have questions, please do not hesitate to call me. I look forward to following Korey Mcgrath along with you.    Sincerely,    Mallory Burgos NP    Enclosure    If you would like to receive this communication electronically, please contact [email or web address] or [phone] to request [employer] Link access.    [employer] Link is a tool which provides read-only access to select patient information with whom you have a relationship. Its easy to use and provides real time access to review your patients record including encounter summaries, notes, results, and demographic information.    If you feel you have received this communication in error or would no longer like to receive these types of communications, please e-mail [email or web address]

## 2020-05-01 NOTE — PROGRESS NOTES
Transplant Recipient Adult Psychosocial Assessment Update  CoVid-19 precaution - Transplant SW Listed Clinic Update conducted with patient by phone.    Korey Mcgrath  [address]  [place] [address]  Telephone Information:   Mobile [phone]   Home  [phone] (home)  Work  There is no work phone number on file.  E-mail  [email or web address]    Sex: female  YOB: 1980  Age: 39 y.o.    Encounter Date: 5/1/2020  [place] Citizen: yes  Primary Language: English   Needed: no    Emergency Contact:  Name: Shabana Arnaldoaidan  Relationship: niece/caregiver  Address: [place], [place]  Phone Numbers:  n/a (home), n/a (work), [phone] (mobile)    Family/Social Support:   Number of dependents/: two sons, ages 15 and 12 (Sukhdeep Jacobs [phone] and Harinder Jacobs [phone])  Marital history: never , two children, Boys' father and grandmother will provide their care while pt stays in [facility] post-op.  Other family dynamics: Pt reports close, supportive family.  Parents are living a niece and three sisters who are supportive and live nearby.    Household Composition:  Name: Korey Mcgrath  Age: 38  Relationship: patient  Does person drive? yes    Name: Marva Jacobs  Age: 15 and 12  Relationship: sons  Does person drive? no    Do you and your caregivers have access to reliable transportation? yes  PRIMARY CAREGIVER: Clara Petty will be primary caregiver, phone number [phone].      provided in-depth information to patient and caregiver regarding pre- and post-transplant caregiver role.   strongly encourages patient and caregiver to have concrete plan regarding post-transplant care giving, including back-up caregiver(s) to ensure care giving needs are met as needed.    Patient states understanding all aspects of caregiver role/commitment and is able/willing/committed to being caregiver to the fullest extent  necessary.    Patient verbalizes understanding of the education provided today and caregiver responsibilities.         remains available. Patient agree to contact  in a timely manner if concerns arise.      Able to take time off work without financial concerns: yes.     Additional Significant Others who will Assist with Transplant:  Name: Cally Mcgrath [phone]  Age: 67  City: [facility] State: [place]  Relationship: mother  Does person drive? yes     Carli Mcgrath, father, [place], [place], drives,  [phone]    Living Will: no  Healthcare Power of : no  Advance Directives on file: <<no information> per medical record.  Verbally reviewed LW/HCPA information.   provided patient with copy of LW/HCPA documents and provided education on completion of forms.    Living Donors: No. Education and resource information given to patient.    Highest Education Level: High School (9-12) or GED  Reading Ability: 11th grade  Reports difficulty with: N/A  Learns Best By:  Reading and discussing     Status: no  VA Benefits: no     Working for Income: No  If no, reason not working: Disability  Patient is disabled.  Prior to disability, patient  was employed as as a manager of a [employer] and ..    Spouse/Significant Other Employment: Niece works at [employer]; mom retired    Disabled: yes: date disability began: 2018, due to: ESRD.    Monthly Income:  SS Disability: $979  Food [place]: $265  Able to afford all costs now and if transplanted, including medications: yes  Patient verbalizes understanding of personal responsibilities related to transplant costs and the importance of having a financial plan to ensure that patients transplant costs are fully covered.       provided fundraising information/education. Patient and Caregiververbalizes understanding.   remains available.    Insurance:   Payor/Plan Subscr  Sex  Relation Sub. Ins. ID Effective Group Num   1. MEDICARE - ME* VINCENT ENRIQUEZ 1980 Female Self [id number] 2/1/19                                    [address]   2. [place]ID [place]* VINCENT ENRIQUEZ 1980 Female  [id number]* 11/1/18                                    [address] [address]         Primary Insurance (for UNOS reporting): Public Insurance - Medicare FFS (Fee For Service)  Secondary Insurance (for UNOS reporting): Public Insurance - Medicaid  Patient verbalizes clear understanding that patient may experience difficulty obtaining and/or be denied insurance coverage post-surgery. This includes and is not limited to disability insurance, life insurance, health insurance, burial insurance, long term care insurance, and other insurances.      Patient also reports understanding that future health concerns related to or unrelated to transplantation may not be covered by patient's insurance.  Resources and information provided and reviewed.     Patient provides verbal permission to release any necessary information to outside resources for patient care and discharge planning.  Resources and information provided are reviewed.      Dialysis Adherence: Patient reports good compliance with dialysis treatment.  See separate note for adherence check.  Infusion Service: patient utilizing? no  Home Health: patient utilizing? no  DME: diabetic equipment and supplies  Pulmonary/Cardiac Rehab: none   ADLS:  Pt states ability to independently accomplish all adls.    Adherence:  Pt states current and expected compliance with all healthcare recommendations.  Adherence education and counseling provided.     Per History Section:  Past Medical History:   Diagnosis Date    Anemia     Diabetes mellitus     Disorder of kidney and ureter     GERD (gastroesophageal reflux disease)     Hyperlipidemia     Hypertension     IDDM (insulin dependent diabetes mellitus)     Obesity     Preeclampsia      Social History     Tobacco  Use    Smoking status: Never Smoker    Smokeless tobacco: Never Used   Substance Use Topics    Alcohol use: Never     Frequency: Never     Social History     Substance and Sexual Activity   Drug Use Never     Social History     Substance and Sexual Activity   Sexual Activity Not on file       Per Today's Psychosocial:  Tobacco: none, patient denies any use.  Alcohol: none, patient denies any use.  Illicit Drugs/Non-prescribed Medications: none, patient denies any use.    Patient states clear understanding of the potential impact of substance use as it relates to transplant candidacy and is aware of possible random substance screening.  Substance abstinence/cessation counseling, education and resources provided and reviewed.     Arrests/DWI/Treatment/Rehab: patient denies    Psychiatric History:    Mental Health: Pt denies mental health concerns  Psychiatrist/Counselor: denies  Medications:   trazadone for sleep  Suicide/Homicide Issues: Pt denies current or past suicidal/homicidal ideation.   Safety at home: Pt denies any home safety concerns.    Knowledge: Patient states having clear understanding and realistic expectations regarding the potential risks and potential benefits of organ transplantation and organ donation and agrees to discuss with health care team members and support system members, as well as to utilize available resources and express questions and/or concerns in order to further facilitate the pt informed decision-making.  Resources and information provided and reviewed.    Patient is aware of [facility]'s affiliation and/or partnership with agencies in home health care, LTAC, SNF, [facility], and other hospitals and clinics.    Understanding: Patient reports having a clear understanding of the many lifetime commitments involved with being a transplant recipient, including costs, compliance, medications, lab work, procedures, appointments, concrete and financial planning, preparedness, timely and  appropriate communication of concerns, abstinence (ETOH, tobacco, illicit non-prescribed drugs), adherence to all health care team recommendations, support system and caregiver involvement, appropriate and timely resource utilization and follow-through, mental health counseling as needed/recommended, and patient and caregiver responsibilities.  Social Service Handbook, resources and detailed educational information provided and reviewed.  Educational information provided.    Patient also reports current and expected compliance with health care regime and states having a clear understanding of the importance of compliance.      Patient reports a clear understanding that risks and benefits may be involved with organ transplantation and with organ donation.       Patient also reports clear understanding that psychosocial risk factors may affect patient, and include but are not limited to feelings of depression, generalized anxiety, anxiety regarding dependence on others, post traumatic stress disorder, feelings of guilt and other emotional and/or mental concerns, and/or exacerbation of existing mental health concerns.  Detailed resources provided and discussed.      Patient agrees to access appropriate resources in a timely manner as needed and/or as recommended, and to communicate concerns appropriately.  Patient also reports a clear understanding of treatment options available.     Patient received education in a group setting.   reviewed education, provided additional information, and answered questions.    Feelings or Concerns: denies    Coping: Pt stated she utilizes sleeping and spending time with family as methods of coping.    Goals: Get off dialysis and return to work.  Patient referred to Vocational Rehabilitation.    Interview Behavior: Patient presents as alert and oriented x 4, pleasant, good eye contact, well groomed, recall good, concentration/judgement good, average intelligence, calm,  communicative, cooperative and asking and answering questions appropriately.  Pt was interviewed by phone due to COVID precautions.    Transplant Social Work - Candidacy  Assessment/Plan:     Psychosocial Suitability: Patient presents as an acceptable candidate for kidney transplant at this time. Based on psychosocial risk factors, patient presents as low risk, due to absence of significant psychosocial risk factors..    Recommendations/Additional Comments: Recommend fundraising and will benefit from [facility] post transplant.    Rehana Wright LCSW

## 2020-07-01 ENCOUNTER — HOSPITAL ENCOUNTER (OUTPATIENT)
Dept: RADIOLOGY | Facility: HOSPITAL | Age: 40
Discharge: HOME | End: 2020-07-01
Attending: NURSE PRACTITIONER
Payer: MEDICARE

## 2020-07-01 ENCOUNTER — HOSPITAL ENCOUNTER (OUTPATIENT)
Dept: CARDIOLOGY | Facility: HOSPITAL | Age: 40
Discharge: HOME | End: 2020-07-01
Attending: NURSE PRACTITIONER
Payer: MEDICARE

## 2020-07-01 VITALS — WEIGHT: 223 LBS | HEIGHT: 64 IN | BODY MASS INDEX: 38.07 KG/M2

## 2020-07-01 DIAGNOSIS — Z76.82 ORGAN TRANSPLANT CANDIDATE: ICD-10-CM

## 2020-07-01 LAB
AORTIC ROOT ANNULUS: 2.82 CM
AV INDEX (PROSTH): 0.66
AV MEAN GRADIENT: 7 MMHG
AV PEAK GRADIENT: 14 MMHG
AV VALVE AREA: 1.99 CM2
AV VELOCITY RATIO: 0.67
BSA FOR ECHO PROCEDURE: 2.14 M2
CV ECHO LV RWT: 0.54 CM
DOP CALC AO PEAK VEL: 1.84 M/S
DOP CALC AO VTI: 41.57 CM
DOP CALC LVOT AREA: 3 CM2
DOP CALC LVOT DIAMETER: 1.96 CM
DOP CALC LVOT PEAK VEL: 1.24 M/S
DOP CALC LVOT STROKE VOLUME: 82.75 CM3
DOP CALC RVOT PEAK VEL: 0.93 M/S
DOP CALC RVOT VTI: 18.2 CM
DOP CALCLVOT PEAK VEL VTI: 27.44 CM
E/A RATIO: 1
E/E' RATIO: 8.09 M/S
ECHO LV POSTERIOR WALL: 1.26 CM (ref 0.6–1.1)
FRACTIONAL SHORTENING: 36 % (ref 28–44)
INTERVENTRICULAR SEPTUM: 1.16 CM (ref 0.6–1.1)
IVRT: 79.92 MSEC
LA MAJOR: 4.47 CM
LA MINOR: 4.47 CM
LA WIDTH: 3.14 CM
LEFT ATRIUM SIZE: 3.75 CM
LEFT ATRIUM VOLUME INDEX: 21.8 ML/M2
LEFT ATRIUM VOLUME: 44.74 CM3
LEFT INTERNAL DIMENSION IN SYSTOLE: 2.97 CM (ref 2.1–4)
LEFT VENTRICLE DIASTOLIC VOLUME INDEX: 48.3 ML/M2
LEFT VENTRICLE DIASTOLIC VOLUME: 98.94 ML
LEFT VENTRICLE MASS INDEX: 102 G/M2
LEFT VENTRICLE SYSTOLIC VOLUME INDEX: 16.7 ML/M2
LEFT VENTRICLE SYSTOLIC VOLUME: 34.17 ML
LEFT VENTRICULAR INTERNAL DIMENSION IN DIASTOLE: 4.63 CM (ref 3.5–6)
LEFT VENTRICULAR MASS: 209.56 G
LV LATERAL E/E' RATIO: 8.9 M/S
LV SEPTAL E/E' RATIO: 7.42 M/S
MV PEAK A VEL: 0.89 M/S
MV PEAK E VEL: 0.89 M/S
PISA TR MAX VEL: 3.66 M/S
PULM VEIN S/D RATIO: 1.73
PV MEAN GRADIENT: 1.74 MMHG
PV PEAK D VEL: 0.48 M/S
PV PEAK S VEL: 0.83 M/S
PV PEAK VELOCITY: 1.27 CM/S
RA MAJOR: 4.05 CM
RA PRESSURE: 3 MMHG
RA WIDTH: 2.94 CM
RIGHT VENTRICULAR END-DIASTOLIC DIMENSION: 2.99 CM
SINUS: 2.06 CM
STJ: 2.1 CM
TDI LATERAL: 0.1 M/S
TDI SEPTAL: 0.12 M/S
TDI: 0.11 M/S
TR MAX PG: 54 MMHG
TRICUSPID ANNULAR PLANE SYSTOLIC EXCURSION: 2.3 CM
TV REST PULMONARY ARTERY PRESSURE: 57 MMHG

## 2020-07-01 PROCEDURE — 93306 ECHO (CUPID ONLY): ICD-10-PCS | Mod: 26,TXP,, | Performed by: INTERNAL MEDICINE

## 2020-07-01 PROCEDURE — 93306 TTE W/DOPPLER COMPLETE: CPT | Mod: TXP

## 2020-07-01 PROCEDURE — 71046 XR CHEST PA AND LATERAL: ICD-10-PCS | Mod: 26,TXP,, | Performed by: RADIOLOGY

## 2020-07-01 PROCEDURE — 71046 X-RAY EXAM CHEST 2 VIEWS: CPT | Mod: TC,TXP

## 2020-07-01 PROCEDURE — 93306 TTE W/DOPPLER COMPLETE: CPT | Mod: 26,TXP,, | Performed by: INTERNAL MEDICINE

## 2020-07-01 PROCEDURE — 71046 X-RAY EXAM CHEST 2 VIEWS: CPT | Mod: 26,TXP,, | Performed by: RADIOLOGY

## 2020-07-07 NOTE — PROGRESS NOTES
Echo report sent to [facility] for pulm HTN and elevated PA pressure. Cards consult TBS. Pt may benefit from increased ultra filtration.

## 2020-07-22 ENCOUNTER — TELEPHONE (OUTPATIENT)
Dept: TRANSPLANT | Facility: CLINIC | Age: 40
End: 2020-07-22

## 2020-07-22 NOTE — TELEPHONE ENCOUNTER
SW received the below request to add caregiver to pt's post transplant plan.        Additional Caregiver Information:   Name: Francis Reynolds  Number: [phone] or [phone]    Since additional caregiver will act on a team of  secondary caregivers, she does not need to be individually educated.       ----- Message from Josey Pitts RN sent at 7/22/2020 10:29 AM CDT -----  Good Morning Ladies,    Pt called to add sister as additional caregiver, Francis Reynolds. Please call to verify & educate.  Please let me know if you need me to do anything.    Thanks  Nicole

## 2020-07-24 ENCOUNTER — TELEPHONE (OUTPATIENT)
Dept: TRANSPLANT | Facility: CLINIC | Age: 40
End: 2020-07-24

## 2020-07-31 ENCOUNTER — OFFICE VISIT (OUTPATIENT)
Dept: CARDIOLOGY | Facility: CLINIC | Age: 40
End: 2020-07-31
Payer: MEDICAID

## 2020-07-31 VITALS
HEIGHT: 65 IN | SYSTOLIC BLOOD PRESSURE: 192 MMHG | WEIGHT: 231.69 LBS | BODY MASS INDEX: 38.6 KG/M2 | HEART RATE: 92 BPM | DIASTOLIC BLOOD PRESSURE: 114 MMHG | OXYGEN SATURATION: 99 %

## 2020-07-31 DIAGNOSIS — Z76.82 PATIENT ON WAITING LIST FOR KIDNEY TRANSPLANT: Chronic | ICD-10-CM

## 2020-07-31 DIAGNOSIS — N18.6 ESRD (END STAGE RENAL DISEASE): ICD-10-CM

## 2020-07-31 DIAGNOSIS — Z01.810 PREOPERATIVE CARDIOVASCULAR EXAMINATION: Primary | ICD-10-CM

## 2020-07-31 DIAGNOSIS — I27.20 PULMONARY HYPERTENSION: ICD-10-CM

## 2020-07-31 PROCEDURE — 99999 PR PBB SHADOW E&M-EST. PATIENT-LVL V: ICD-10-PCS | Mod: PBBFAC,TXP,, | Performed by: INTERNAL MEDICINE

## 2020-07-31 PROCEDURE — 99215 OFFICE O/P EST HI 40 MIN: CPT | Mod: PBBFAC,TXP | Performed by: INTERNAL MEDICINE

## 2020-07-31 PROCEDURE — 99204 OFFICE O/P NEW MOD 45 MIN: CPT | Mod: S$PBB,NTX,, | Performed by: INTERNAL MEDICINE

## 2020-07-31 PROCEDURE — 99999 PR PBB SHADOW E&M-EST. PATIENT-LVL V: CPT | Mod: PBBFAC,TXP,, | Performed by: INTERNAL MEDICINE

## 2020-07-31 PROCEDURE — 99204 PR OFFICE/OUTPT VISIT, NEW, LEVL IV, 45-59 MIN: ICD-10-PCS | Mod: S$PBB,NTX,, | Performed by: INTERNAL MEDICINE

## 2020-07-31 NOTE — PATIENT INSTRUCTIONS
Take your blood pressure each morning and evening for 1 week.  Record the blood pressure, pulse, date and time (AM or PM) and then send the the results all together at 1 time to Dr. Huntley.

## 2020-07-31 NOTE — Clinical Note
Thank you for referring Korey Mcgrath for preoperative cardiovascular evaluation is. Please see my note for details of this encounter. If you have any questions, please contact me.  Thank you again for the referral.

## 2020-07-31 NOTE — LETTER
July 31, 2020      Mallory Burgos, ASHLY  [address] [facility]  1st Floor Clinic  [facility] [address]           [facility]  [address]  [place] [address]  Phone: [phone]          Patient: Korey Mcgrath   MR Number: [id number]   YOB: 1980   Date of Visit: 7/31/2020       Dear Mallory Burgos:    Thank you for referring Korey Mcgrath to me for evaluation. Attached you will find relevant portions of my assessment and plan of care.    If you have questions, please do not hesitate to call me. I look forward to following Korey Mcgrath along with you.    Sincerely,    Desmond Huntley MD    Enclosure  CC:  No Recipients    If you would like to receive this communication electronically, please contact [email or web address] or [phone] to request more information on [employer] access.    For providers and/or their staff who would like to refer a patient to [facility], please contact us through our one-stop-shop provider referral line, [facility], at [phone].    If you feel you have received this communication in error or would no longer like to receive these types of communications, please e-mail [email or web address]

## 2020-07-31 NOTE — PROGRESS NOTES
Chart has been dictated using voice recognition software.  It is not been reviewed carefully for any transcriptional errors due to this technology.   Subjective:   Patient ID:  Korey Mcgrath is a 40 y.o. female who presents for evaluation of Hypertension      HPI: patient with Type I diabetes, ESRD on hemodialysis, hyperlipidemia, hypertension, obesity, and GERD.  Patient had echocardiogram as part of pre-op screening showed pulmonary systolic BP at 57 mm Hg.    Patient denies any chest discomfort on exertion or at rest.  Patient denies any dyspnea at rest or on exertion, orthopnea, PND, or edema. , except when fluid overloaded prior to dialyisis. Able to exercise, ride bike, etc.        Cardiac risk factors: diabetes, hyperlipidemia, hypertension, obesity, positive family history    Past Medical History:   Diagnosis Date    Anemia     Diabetes mellitus     Disorder of kidney and ureter     GERD (gastroesophageal reflux disease)     Hyperlipidemia     Hypertension     IDDM (insulin dependent diabetes mellitus)     Obesity     Preeclampsia        Past Surgical History:   Procedure Laterality Date    AV FISTULA PLACEMENT       SECTION         Social History     Tobacco Use    Smoking status: Never Smoker    Smokeless tobacco: Never Used   Substance Use Topics    Alcohol use: Never     Frequency: Never    Drug use: Never       Outpatient Medications Prior to Visit   Medication Sig Dispense Refill    amLODIPine (NORVASC) 10 MG tablet Take 10 mg by mouth once daily.      BIOTIN ORAL Take 1 tablet by mouth once daily.      calcium acetate (PHOSLYRA) 667 mg (169 mg calcium)/5 mL Soln Take 6 tablets by mouth 3 (three) times daily with meals.      doxazosin (CARDURA) 2 MG tablet Take 2 mg by mouth once daily.      furosemide (LASIX) 40 MG tablet Take 40 mg by mouth once daily.      hydrALAZINE (APRESOLINE) 25 MG tablet Take 75 mg by mouth 3 (three) times daily.      insulin aspart U-100  "(NOVOLOG U-100 INSULIN ASPART) 100 unit/mL injection Inject 12 Units into the skin 2 (two) times daily.      insulin glargine (LANTUS U-100 INSULIN) 100 unit/mL injection Inject 10 Units into the skin every evening.      losartan (COZAAR) 100 MG tablet Take 100 mg by mouth once daily.      metoprolol tartrate (LOPRESSOR) 50 MG tablet Take 75 mg by mouth 2 (two) times daily.      ondansetron (ZOFRAN) 4 MG tablet Take 4 mg by mouth every 12 (twelve) hours as needed for Nausea.      pantoprazole (PROTONIX) 40 MG tablet Take 40 mg by mouth once daily.      pravastatin (PRAVACHOL) 80 MG tablet Take 80 mg by mouth every evening.      SURE COMFORT PEN NEEDLE 32 gauge x 5/32" Ndle       traZODone (DESYREL) 100 MG tablet Take 100 mg by mouth every evening.      VELPHORO 500 mg Chew Take 500 mg by mouth 2 (two) times a day. With meals      metoclopramide HCl (REGLAN) 10 MG tablet Take 5 mg by mouth 4 (four) times daily before meals and nightly.      traMADol (ULTRAM) 50 mg tablet Take 50 mg by mouth every 8 (eight) hours as needed for Pain.       No facility-administered medications prior to visit.        Review of patient's allergies indicates:   Allergen Reactions    Nitrofurantoin monohyd/m-cryst Anaphylaxis, Hives and Swelling     CLOSES THROAT    Ibuprofen Swelling       Review of Systems   Constitution: Negative for weight gain and weight loss.   HENT: Negative for nosebleeds.    Eyes: Negative for vision loss in left eye and vision loss in right eye.   Cardiovascular: Negative for claudication.        As above   Respiratory: Negative for hemoptysis, shortness of breath, snoring, sputum production and wheezing.    Endocrine: Negative for polydipsia and polyuria.   Hematologic/Lymphatic: Does not bruise/bleed easily.   Musculoskeletal: Negative for myalgias.   Gastrointestinal: Negative for change in bowel habit, hematemesis, hematochezia, melena, nausea and vomiting.   Genitourinary: Negative for hematuria. " "  Neurological: Negative for focal weakness and numbness.      Objective:   Physical Exam   Constitutional: She is oriented to person, place, and time. She appears well-developed and well-nourished.   BP (!) 192/114 (BP Location: Left leg)   Pulse 92   Ht 5' 5" (1.651 m)   Wt 105.1 kg (231 lb 11.3 oz)   SpO2 99%   BMI 38.56 kg/m²    Neck: Neck supple. No JVD present. Carotid bruit is not present. No thyromegaly present.   Cardiovascular: Normal rate, regular rhythm, S1 normal, S2 normal and intact distal pulses. Exam reveals gallop and S4 (soft). Exam reveals no friction rub.   No murmur heard.  Pulmonary/Chest: Breath sounds normal. She has no wheezes. She has no rales.   Abdominal: Soft. Bowel sounds are normal. There is no hepatosplenomegaly. There is no abdominal tenderness.   Musculoskeletal:         General: No edema.   Neurological: She is alert and oriented to person, place, and time. She has normal strength.   Skin: No cyanosis. Nails show no clubbing.       Lab Results   Component Value Date    WBC 9.22 04/03/2019    HGB 11.4 (L) 04/03/2019    HCT 36.7 (L) 04/03/2019    MCV 97 04/03/2019     04/03/2019       Lab Results   Component Value Date     04/03/2019    K 3.6 04/03/2019    BUN 28 (H) 04/03/2019    CREATININE 4.4 (H) 04/03/2019     (H) 04/03/2019    HGBA1C 9.1 (H) 04/03/2019    CHOL 160 04/03/2019    HDL 53 04/03/2019    LDLCALC 83.0 04/03/2019    TRIG 120 04/03/2019    CHOLHDL 33.1 04/03/2019    HGB 11.4 (L) 04/03/2019    HCT 36.7 (L) 04/03/2019     04/03/2019    INR 1.1 04/03/2019       Assessment:     1. Preoperative cardiovascular examination    2. Patient on waiting list for kidney transplant    3. ESRD (end stage renal disease)    4. IDDM (insulin dependent diabetes mellitus)    5. Pulmonary hypertension    6. BMI 37.0-37.9, adult      The patient is likely free from coronary artery disease and significant arrhythmia.  She does not have any symptoms of heart " failure.  Her echocardiogram shows increased pulmonary pressure.  Her arm blood pressure today is elevated and she said it was taken from her arms which should be done and the I did not appropriate cuff her thigh pressures.  Patient notes that her blood pressure by thigh cuff usually is lower than normal range.    Therefore, patient will demonstrate that her home blood pressures are well controlled a recording her home blood pressure twice a day and send a weeks worth recordings to me.  Her blood pressure is well controlled at home, the patient will be sent for right heart catheterization to see what the true nature of her pulmonary hypertension is from as well as a true reading of her arterial blood pressure.  If her blood pressure is high, then medications will be altered to bring it down subsequent measurement and her pressure.    If the patient's hypertension is due to her AV shunt or secondary to left heart pressures, then the patient has no additional risk from a cardiac point of view for renal transplantation. Unless there are intervening problems, patient should return for re-evaluation in 3 months.   Plan:     Korey was seen today for hypertension.    Diagnoses and all orders for this visit:    Preoperative cardiovascular examination    Patient on waiting list for kidney transplant    ESRD (end stage renal disease)    IDDM (insulin dependent diabetes mellitus)    Pulmonary hypertension    BMI 37.0-37.9, adult          Desmond Huntley MD  Consultative Cardiology

## 2020-11-04 ENCOUNTER — OFFICE VISIT (OUTPATIENT)
Dept: CARDIOLOGY | Facility: CLINIC | Age: 40
End: 2020-11-04
Payer: MEDICARE

## 2020-11-04 VITALS
SYSTOLIC BLOOD PRESSURE: 147 MMHG | DIASTOLIC BLOOD PRESSURE: 76 MMHG | WEIGHT: 245.13 LBS | BODY MASS INDEX: 40.84 KG/M2 | HEIGHT: 65 IN | HEART RATE: 75 BPM

## 2020-11-04 DIAGNOSIS — I10 ESSENTIAL HYPERTENSION: ICD-10-CM

## 2020-11-04 DIAGNOSIS — Z76.82 PATIENT ON WAITING LIST FOR KIDNEY TRANSPLANT: Chronic | ICD-10-CM

## 2020-11-04 DIAGNOSIS — N18.6 ESRD (END STAGE RENAL DISEASE): ICD-10-CM

## 2020-11-04 DIAGNOSIS — Z01.810 PREOPERATIVE CARDIOVASCULAR EXAMINATION: ICD-10-CM

## 2020-11-04 DIAGNOSIS — I27.20 PULMONARY HYPERTENSION: Primary | ICD-10-CM

## 2020-11-04 PROBLEM — E66.01 MORBID OBESITY WITH BMI OF 40.0-44.9, ADULT: Status: ACTIVE | Noted: 2019-04-03

## 2020-11-04 PROCEDURE — 99213 PR OFFICE/OUTPT VISIT, EST, LEVL III, 20-29 MIN: ICD-10-PCS | Mod: S$PBB,TXP,, | Performed by: PHYSICIAN ASSISTANT

## 2020-11-04 PROCEDURE — 99213 OFFICE O/P EST LOW 20 MIN: CPT | Mod: S$PBB,TXP,, | Performed by: PHYSICIAN ASSISTANT

## 2020-11-04 PROCEDURE — 99214 OFFICE O/P EST MOD 30 MIN: CPT | Mod: PBBFAC,TXP | Performed by: PHYSICIAN ASSISTANT

## 2020-11-04 PROCEDURE — 99999 PR PBB SHADOW E&M-EST. PATIENT-LVL IV: CPT | Mod: PBBFAC,TXP,, | Performed by: PHYSICIAN ASSISTANT

## 2020-11-04 PROCEDURE — 99999 PR PBB SHADOW E&M-EST. PATIENT-LVL IV: ICD-10-PCS | Mod: PBBFAC,TXP,, | Performed by: PHYSICIAN ASSISTANT

## 2020-11-04 NOTE — PROGRESS NOTES
General Cardiology Clinic Note  Reason for Visit: Blood pressure  Last Clinic Visit: 7/31/2020 with Dr Huntley     HPI:   Korey Mcgrath is a 40 y.o. female with ESRD on HD, pulmonary HTN, HLD, and obesity who presents for 3 month follow up. She was initially referred for cardiac evaluation prior to kidney transplant. She had an  echocardiogram as part of pre-op screening showed pulmonary systolic BP at 57 mm Hg.       HPI:  Ms Mcgrath was initially referred for cardiac evaluation prior to kidney transplant. She had an  echocardiogram as part of pre-op screening that showed pulmonary systolic BP at 57 mm Hg. Her BP was significantly elevated during last appt; however, it was not taken properly (she can only check BP in her legs since she has grafts on b/l upper extremities). She kept a BP log at home and it ranged 100-120s systolic and 60-70s diastolic on average.     Patient denies any chest discomfort on exertion or at rest.  Patient denies any dyspnea at rest or on exertion, orthopnea, PND, or edema. , She does get occasional ROBERTSON and edema prior to dialysis if she does not follow fluid restrictions. Even then, her symptoms do not limit her physical activity.         Cardiac risk factors: diabetes, hyperlipidemia, hypertension, obesity, positive family history    ROS:    Constitution: Negative for decreased appetite, diaphoresis, fever, malaise/fatigue, weight gain and weight loss.   HENT: Negative for congestion, nosebleeds and sore throat.    Eyes: Negative for blurred vision, vision loss in left eye, vision loss in right eye and visual disturbance.  Cardiovascular: Negative for chest pain, claudication, dyspnea on exertion, leg swelling, near-syncope, orthopnea, palpitations, paroxysmal nocturnal dyspnea and syncope.   Respiratory: Negative for cough, hemoptysis, snoring, shortness of breath and wheezing..    Hematologic/Lymphatic: Negative for bleeding problem. Does not bruise/bleed easily.   Endocrine:  Negative for polyuria  Musculoskeletal: Negative for muscle cramps and myalgias.   Gastrointestinal: Negative for abdominal pain, change in bowel habit, diarrhea, heartburn, hematemesis, hematochezia, melena, nausea and vomiting.   Neurological: Negative for extremity weakness or numbness, dizziness, headaches and light-headedness.   Psychiatric/Behavioral: Negative for depression.   Allergic/Immunologic: Negative for hives.   PMH:     Past Medical History:   Diagnosis Date    Anemia     Diabetes mellitus     Disorder of kidney and ureter     GERD (gastroesophageal reflux disease)     Hyperlipidemia     Hypertension     IDDM (insulin dependent diabetes mellitus)     Obesity     Preeclampsia      Past Surgical History:   Procedure Laterality Date    AV FISTULA PLACEMENT       SECTION       Allergies:     Review of patient's allergies indicates:   Allergen Reactions    Nitrofurantoin monohyd/m-cryst Anaphylaxis, Hives and Swelling     CLOSES THROAT    Ibuprofen Swelling     Medications:     Current Outpatient Medications on File Prior to Visit   Medication Sig Dispense Refill    amLODIPine (NORVASC) 10 MG tablet Take 10 mg by mouth once daily.      BIOTIN ORAL Take 1 tablet by mouth once daily.      calcium acetate (PHOSLYRA) 667 mg (169 mg calcium)/5 mL Soln Take 6 tablets by mouth 3 (three) times daily with meals.      doxazosin (CARDURA) 2 MG tablet Take 2 mg by mouth once daily.      furosemide (LASIX) 40 MG tablet Take 40 mg by mouth once daily.      hydrALAZINE (APRESOLINE) 25 MG tablet Take 75 mg by mouth 3 (three) times daily.      insulin aspart U-100 (NOVOLOG U-100 INSULIN ASPART) 100 unit/mL injection Inject 12 Units into the skin 2 (two) times daily.      insulin glargine (LANTUS U-100 INSULIN) 100 unit/mL injection Inject 10 Units into the skin every evening.      losartan (COZAAR) 100 MG tablet Take 100 mg by mouth once daily.      metoclopramide HCl (REGLAN) 10 MG tablet  "Take 5 mg by mouth 4 (four) times daily before meals and nightly.      metoprolol tartrate (LOPRESSOR) 50 MG tablet Take 75 mg by mouth 2 (two) times daily.      ondansetron (ZOFRAN) 4 MG tablet Take 4 mg by mouth every 12 (twelve) hours as needed for Nausea.      pantoprazole (PROTONIX) 40 MG tablet Take 40 mg by mouth once daily.      pravastatin (PRAVACHOL) 80 MG tablet Take 80 mg by mouth every evening.      SURE COMFORT PEN NEEDLE 32 gauge x 5/32" Ndle       traMADol (ULTRAM) 50 mg tablet Take 50 mg by mouth every 8 (eight) hours as needed for Pain.      traZODone (DESYREL) 100 MG tablet Take 100 mg by mouth every evening.      VELPHORO 500 mg Chew Take 500 mg by mouth 2 (two) times a day. With meals       No current facility-administered medications on file prior to visit.      Social History:     Social History     Tobacco Use    Smoking status: Never Smoker    Smokeless tobacco: Never Used   Substance Use Topics    Alcohol use: Never     Frequency: Never     Family History:     Family History   Problem Relation Age of Onset    Tuberculosis Mother     Hypertension Mother     Hypertension Father     Asthma Sister     Stroke Maternal Grandmother     Heart disease Maternal Grandmother     Diabetes Maternal Aunt     Diabetes Maternal Uncle     Kidney disease Paternal Uncle     Heart disease Maternal Grandfather      Physical Exam:   BP (!) 147/76 (BP Location: Left leg, Patient Position: Sitting, BP Method: X-Large (Automatic))   Pulse 75   Ht 5' 5.25" (1.657 m)   Wt 111.2 kg (245 lb 2.4 oz)   BMI 40.48 kg/m²        Physical Exam   Constitutional: She is oriented to person, place, and time and well-developed, well-nourished, and in no distress.   HENT:   Head: Normocephalic and atraumatic.   Eyes: Conjunctivae and EOM are normal. No scleral icterus.   Neck: Neck supple. No JVD present. No thyromegaly present.   Cardiovascular: Normal rate, regular rhythm and intact distal pulses. Exam " reveals no gallop and no friction rub.   Murmur heard.   Systolic murmur is present with a grade of 1/6.  Pulmonary/Chest: Effort normal and breath sounds normal. She has no wheezes. She has no rales. She exhibits no tenderness.   Abdominal: Soft. Bowel sounds are normal. She exhibits no distension. There is no abdominal tenderness.   Musculoskeletal:         General: No edema.   Neurological: She is alert and oriented to person, place, and time.   Skin: Skin is warm and dry. No rash noted. No cyanosis or erythema. No pallor. Nails show no clubbing.   Psychiatric: Affect normal.   Nursing note and vitals reviewed.       Labs:     Lab Results   Component Value Date     04/03/2019    K 3.6 04/03/2019    CL 97 04/03/2019    CO2 28 04/03/2019    BUN 28 (H) 04/03/2019    CREATININE 4.4 (H) 04/03/2019    ANIONGAP 12 04/03/2019     Lab Results   Component Value Date    HGBA1C 9.1 (H) 04/03/2019     No results found for: BNP, BNPTRIAGEBLO Lab Results   Component Value Date    WBC 9.22 04/03/2019    HGB 11.4 (L) 04/03/2019    HCT 36.7 (L) 04/03/2019     04/03/2019    GRAN 6.6 04/03/2019    GRAN 71.2 04/03/2019     Lab Results   Component Value Date    CHOL 160 04/03/2019    HDL 53 04/03/2019    LDLCALC 83.0 04/03/2019    TRIG 120 04/03/2019          Imaging:   TTE 7/1/20  · Mild concentric left ventricular hypertrophy.  · Left ventricular systolic function. The estimated ejection fraction is 60%.  · Normal LV diastolic function.  · No wall motion abnormalities.  · Normal right ventricular systolic function.  · Normal central venous pressure (3 mmHg).  · The estimated PA systolic pressure is 57 mmHg.  · Pulmonary hypertension present.  · Small circumferential pericardial effusion.      Assessment:     1. Pulmonary hypertension    2. Essential hypertension    3. Preoperative cardiovascular examination    4. ESRD (end stage renal disease)    5. Patient on waiting list for kidney transplant      Plan:     ESRD on  HD, pre-op kidney transplant   Pulmonary HTN  -Systemic BP is reportedly well controlled at home. Will recheck PASP on TTE following dialysis. If still significantly elevated despite being euvolemic, will refer for right heart catheterization to determine true nature and severity of pulmonary hypertension.     Systemic hypertension  -Well controlled at home.   -Continue Doxazosin 2 mg, Amlodipine 10 mg daily, Hydralazine 25 mg tid, Losartan 100 mg daily, and Metoprolol 75 mg bid    This patient was discussed with Dr Huntley.    Signed:  Lauren Sims PA-C  Cardiology   [phone] - Interventional  [phone] - General  11/4/2020 1:14 PM

## 2020-11-19 ENCOUNTER — HOSPITAL ENCOUNTER (OUTPATIENT)
Dept: CARDIOLOGY | Facility: HOSPITAL | Age: 40
Discharge: HOME | End: 2020-11-19
Attending: PHYSICIAN ASSISTANT
Payer: MEDICAID

## 2020-11-19 VITALS
WEIGHT: 245 LBS | HEART RATE: 76 BPM | HEIGHT: 65 IN | SYSTOLIC BLOOD PRESSURE: 147 MMHG | DIASTOLIC BLOOD PRESSURE: 76 MMHG | BODY MASS INDEX: 40.82 KG/M2

## 2020-11-19 DIAGNOSIS — I27.20 PULMONARY HYPERTENSION: ICD-10-CM

## 2020-11-19 PROCEDURE — 93306 TTE W/DOPPLER COMPLETE: CPT | Mod: TXP

## 2020-11-19 PROCEDURE — 93306 ECHO (CUPID ONLY): ICD-10-PCS | Mod: 26,TXP,, | Performed by: INTERNAL MEDICINE

## 2020-11-19 PROCEDURE — 93306 TTE W/DOPPLER COMPLETE: CPT | Mod: 26,TXP,, | Performed by: INTERNAL MEDICINE

## 2020-11-20 ENCOUNTER — DOCUMENTATION ONLY (OUTPATIENT)
Dept: CARDIOLOGY | Facility: CLINIC | Age: 40
End: 2020-11-20

## 2020-11-20 LAB
ASCENDING AORTA: 2.39 CM
AV INDEX (PROSTH): 0.71
AV MEAN GRADIENT: 4 MMHG
AV PEAK GRADIENT: 6 MMHG
AV VALVE AREA: 2.14 CM2
AV VELOCITY RATIO: 0.64
BSA FOR ECHO PROCEDURE: 2.26 M2
CV ECHO LV RWT: 0.67 CM
DOP CALC AO PEAK VEL: 1.21 M/S
DOP CALC AO VTI: 22.03 CM
DOP CALC LVOT AREA: 3 CM2
DOP CALC LVOT DIAMETER: 1.96 CM
DOP CALC LVOT PEAK VEL: 0.78 M/S
DOP CALC LVOT STROKE VOLUME: 47.16 CM3
DOP CALC RVOT PEAK VEL: 0.72 M/S
DOP CALC RVOT VTI: 13.97 CM
DOP CALCLVOT PEAK VEL VTI: 15.64 CM
E WAVE DECELERATION TIME: 237.58 MSEC
E/A RATIO: 1.27
E/E' RATIO: 11.56 M/S
ECHO LV POSTERIOR WALL: 1.16 CM (ref 0.6–1.1)
FRACTIONAL SHORTENING: 37 % (ref 28–44)
INTERVENTRICULAR SEPTUM: 1.15 CM (ref 0.6–1.1)
IVRT: 79.92 MSEC
LA MAJOR: 4.48 CM
LA MINOR: 3.81 CM
LA WIDTH: 3.21 CM
LEFT ATRIUM SIZE: 3.12 CM
LEFT ATRIUM VOLUME INDEX: 16.3 ML/M2
LEFT ATRIUM VOLUME: 35.06 CM3
LEFT INTERNAL DIMENSION IN SYSTOLE: 2.2 CM (ref 2.1–4)
LEFT VENTRICLE DIASTOLIC VOLUME INDEX: 23.13 ML/M2
LEFT VENTRICLE DIASTOLIC VOLUME: 49.87 ML
LEFT VENTRICLE MASS INDEX: 59 G/M2
LEFT VENTRICLE SYSTOLIC VOLUME INDEX: 7.6 ML/M2
LEFT VENTRICLE SYSTOLIC VOLUME: 16.29 ML
LEFT VENTRICULAR INTERNAL DIMENSION IN DIASTOLE: 3.47 CM (ref 3.5–6)
LEFT VENTRICULAR MASS: 126.5 G
LV LATERAL E/E' RATIO: 13 M/S
LV SEPTAL E/E' RATIO: 10.4 M/S
MV PEAK A VEL: 0.41 M/S
MV PEAK E VEL: 0.52 M/S
MV STENOSIS PRESSURE HALF TIME: 68.9 MS
MV VALVE AREA P 1/2 METHOD: 3.19 CM2
PISA TR MAX VEL: 1.58 M/S
PULM VEIN S/D RATIO: 0.81
PV MEAN GRADIENT: 1.21 MMHG
PV PEAK D VEL: 0.37 M/S
PV PEAK S VEL: 0.3 M/S
PV PEAK VELOCITY: 0.76 CM/S
RA MAJOR: 3.93 CM
RA WIDTH: 3.11 CM
RIGHT VENTRICULAR END-DIASTOLIC DIMENSION: 2.97 CM
SINUS: 2.08 CM
STJ: 1.71 CM
TDI LATERAL: 0.04 M/S
TDI SEPTAL: 0.05 M/S
TDI: 0.05 M/S
TR MAX PG: 10 MMHG

## 2020-11-20 NOTE — PROGRESS NOTES
Dr. Crawley reviewed TTE images. There is no evidence of pulmonary hypertension on her echo and the RVOT acceleration time is consistent with a normal PASP. There is no good TR signal to give an exact estimate of a PA pressure, but it appears to be normal. She does not require any further cardiac testing at this time.     Her estimated risk with the proposed surgery/procedure for an adverse cardiac outcome is 15.0% (95% CI 11.1%-20.0%) (Revised Cardiac Risk Index [RCRI] Score = >=3) based on the following risk factors: Use of insulin therapy for diabetes, Preoperative serum creatinine > 2.0 mg/dL and High risk surgery (intraperitoneal, intrathoracic, or suprainguinal vascular).

## 2021-01-08 ENCOUNTER — PATIENT MESSAGE (OUTPATIENT)
Dept: TRANSPLANT | Facility: CLINIC | Age: 41
End: 2021-01-08

## 2021-01-27 ENCOUNTER — HISTORICAL (OUTPATIENT)
Dept: RADIOLOGY | Facility: HOSPITAL | Age: 41
End: 2021-01-27

## 2021-04-01 DIAGNOSIS — Z76.82 ORGAN TRANSPLANT CANDIDATE: Primary | ICD-10-CM

## 2021-04-21 DIAGNOSIS — Z76.82 ORGAN TRANSPLANT CANDIDATE: Primary | ICD-10-CM

## 2021-04-26 ENCOUNTER — TELEPHONE (OUTPATIENT)
Dept: TRANSPLANT | Facility: CLINIC | Age: 41
End: 2021-04-26

## 2021-05-10 ENCOUNTER — HISTORICAL (OUTPATIENT)
Dept: ADMINISTRATIVE | Facility: HOSPITAL | Age: 41
End: 2021-05-10

## 2021-05-10 LAB
ABS NEUT (OLG): 8.34 X10(3)/MCL (ref 2.1–9.2)
ALBUMIN SERPL-MCNC: 3.5 GM/DL (ref 3.5–5)
ALBUMIN/GLOB SERPL: 0.9 RATIO (ref 1.1–2)
ALP SERPL-CCNC: 83 UNIT/L (ref 40–150)
ALT SERPL-CCNC: 8 UNIT/L (ref 0–55)
AST SERPL-CCNC: 13 UNIT/L (ref 5–34)
BASOPHILS # BLD AUTO: 0.1 X10(3)/MCL (ref 0–0.2)
BASOPHILS NFR BLD AUTO: 1 %
BILIRUB SERPL-MCNC: 0.6 MG/DL
BILIRUBIN DIRECT+TOT PNL SERPL-MCNC: 0.2 MG/DL (ref 0–0.5)
BILIRUBIN DIRECT+TOT PNL SERPL-MCNC: 0.4 MG/DL (ref 0–0.8)
BUN SERPL-MCNC: 61.4 MG/DL (ref 7–18.7)
CALCIUM SERPL-MCNC: 10 MG/DL (ref 8.4–10.2)
CHLORIDE SERPL-SCNC: 96 MMOL/L (ref 98–107)
CHOLEST SERPL-MCNC: 182 MG/DL
CHOLEST/HDLC SERPL: 5 {RATIO} (ref 0–5)
CO2 SERPL-SCNC: 22 MMOL/L (ref 22–29)
CREAT SERPL-MCNC: 7.76 MG/DL (ref 0.55–1.02)
EOSINOPHIL # BLD AUTO: 0.3 X10(3)/MCL (ref 0–0.9)
EOSINOPHIL NFR BLD AUTO: 2 %
ERYTHROCYTE [DISTWIDTH] IN BLOOD BY AUTOMATED COUNT: 15.2 % (ref 11.5–14.5)
EST. AVERAGE GLUCOSE BLD GHB EST-MCNC: 151.3 MG/DL
GLOBULIN SER-MCNC: 3.8 GM/DL (ref 2.4–3.5)
GLUCOSE SERPL-MCNC: 216 MG/DL (ref 74–100)
HBA1C MFR BLD: 6.9 %
HCT VFR BLD AUTO: 35.4 % (ref 35–46)
HDLC SERPL-MCNC: 40 MG/DL (ref 35–60)
HGB BLD-MCNC: 11.4 GM/DL (ref 12–16)
IMM GRANULOCYTES # BLD AUTO: 0.04 10*3/UL
IMM GRANULOCYTES NFR BLD AUTO: 0 %
LDLC SERPL CALC-MCNC: 106 MG/DL (ref 50–140)
LYMPHOCYTES # BLD AUTO: 1.8 X10(3)/MCL (ref 0.6–4.6)
LYMPHOCYTES NFR BLD AUTO: 16 %
MCH RBC QN AUTO: 32.9 PG (ref 26–34)
MCHC RBC AUTO-ENTMCNC: 32.2 GM/DL (ref 31–37)
MCV RBC AUTO: 102 FL (ref 80–100)
MONOCYTES # BLD AUTO: 0.7 X10(3)/MCL (ref 0.1–1.3)
MONOCYTES NFR BLD AUTO: 6 %
NEUTROPHILS # BLD AUTO: 8.34 X10(3)/MCL (ref 2.1–9.2)
NEUTROPHILS NFR BLD AUTO: 75 %
PLATELET # BLD AUTO: 312 X10(3)/MCL (ref 130–400)
PMV BLD AUTO: 10.8 FL (ref 7.4–10.4)
POTASSIUM SERPL-SCNC: 3.7 MMOL/L (ref 3.5–5.1)
PROT SERPL-MCNC: 7.3 GM/DL (ref 6.4–8.3)
RBC # BLD AUTO: 3.47 X10(6)/MCL (ref 4–5.2)
SODIUM SERPL-SCNC: 135 MMOL/L (ref 136–145)
T4 FREE SERPL-MCNC: 0.99 NG/DL (ref 0.7–1.48)
TRIGL SERPL-MCNC: 181 MG/DL (ref 37–140)
TSH SERPL-ACNC: 2.17 UIU/ML (ref 0.35–4.94)
VLDLC SERPL CALC-MCNC: 36 MG/DL
WBC # SPEC AUTO: 11.2 X10(3)/MCL (ref 4.5–11)

## 2021-05-12 ENCOUNTER — PATIENT MESSAGE (OUTPATIENT)
Dept: RESEARCH | Facility: HOSPITAL | Age: 41
End: 2021-05-12

## 2021-06-30 ENCOUNTER — TELEPHONE (OUTPATIENT)
Dept: CARDIOLOGY | Facility: CLINIC | Age: 41
End: 2021-06-30

## 2021-07-01 ENCOUNTER — TELEPHONE (OUTPATIENT)
Dept: TRANSPLANT | Facility: CLINIC | Age: 41
End: 2021-07-01

## 2021-07-02 ENCOUNTER — HOSPITAL ENCOUNTER (OUTPATIENT)
Dept: CARDIOLOGY | Facility: HOSPITAL | Age: 41
Discharge: HOME | End: 2021-07-02
Attending: NURSE PRACTITIONER
Payer: MEDICARE

## 2021-07-02 ENCOUNTER — HOSPITAL ENCOUNTER (OUTPATIENT)
Dept: CARDIOLOGY | Facility: HOSPITAL | Age: 41
Discharge: HOME | End: 2021-07-02
Attending: NURSE PRACTITIONER
Payer: MEDICAID

## 2021-07-02 ENCOUNTER — HOSPITAL ENCOUNTER (OUTPATIENT)
Dept: RADIOLOGY | Facility: HOSPITAL | Age: 41
Discharge: HOME | End: 2021-07-02
Attending: NURSE PRACTITIONER
Payer: MEDICAID

## 2021-07-02 ENCOUNTER — HOSPITAL ENCOUNTER (OUTPATIENT)
Dept: RADIOLOGY | Facility: HOSPITAL | Age: 41
Discharge: HOME | End: 2021-07-02
Attending: NURSE PRACTITIONER
Payer: MEDICARE

## 2021-07-02 ENCOUNTER — OFFICE VISIT (OUTPATIENT)
Dept: TRANSPLANT | Facility: CLINIC | Age: 41
End: 2021-07-02
Payer: MEDICARE

## 2021-07-02 VITALS
WEIGHT: 235 LBS | SYSTOLIC BLOOD PRESSURE: 127 MMHG | TEMPERATURE: 98 F | HEART RATE: 100 BPM | RESPIRATION RATE: 18 BRPM | OXYGEN SATURATION: 100 % | DIASTOLIC BLOOD PRESSURE: 61 MMHG | HEIGHT: 65 IN | BODY MASS INDEX: 39.15 KG/M2

## 2021-07-02 VITALS — SYSTOLIC BLOOD PRESSURE: 120 MMHG | BODY MASS INDEX: 40.82 KG/M2 | HEIGHT: 65 IN | WEIGHT: 245 LBS | HEART RATE: 80 BPM

## 2021-07-02 DIAGNOSIS — Z76.82 ORGAN TRANSPLANT CANDIDATE: ICD-10-CM

## 2021-07-02 DIAGNOSIS — N18.6 ESRD ON HEMODIALYSIS: ICD-10-CM

## 2021-07-02 DIAGNOSIS — N18.6 CONTROLLED TYPE 2 DIABETES MELLITUS WITH CHRONIC KIDNEY DISEASE ON CHRONIC DIALYSIS, WITH LONG-TERM CURRENT USE OF INSULIN: ICD-10-CM

## 2021-07-02 DIAGNOSIS — Z76.82 PATIENT ON WAITING LIST FOR KIDNEY TRANSPLANT: Primary | Chronic | ICD-10-CM

## 2021-07-02 DIAGNOSIS — I10 ESSENTIAL HYPERTENSION: ICD-10-CM

## 2021-07-02 DIAGNOSIS — Z79.4 CONTROLLED TYPE 2 DIABETES MELLITUS WITH CHRONIC KIDNEY DISEASE ON CHRONIC DIALYSIS, WITH LONG-TERM CURRENT USE OF INSULIN: ICD-10-CM

## 2021-07-02 DIAGNOSIS — Z99.2 ESRD ON HEMODIALYSIS: ICD-10-CM

## 2021-07-02 DIAGNOSIS — Z99.2 CONTROLLED TYPE 2 DIABETES MELLITUS WITH CHRONIC KIDNEY DISEASE ON CHRONIC DIALYSIS, WITH LONG-TERM CURRENT USE OF INSULIN: ICD-10-CM

## 2021-07-02 DIAGNOSIS — E11.22 CONTROLLED TYPE 2 DIABETES MELLITUS WITH CHRONIC KIDNEY DISEASE ON CHRONIC DIALYSIS, WITH LONG-TERM CURRENT USE OF INSULIN: ICD-10-CM

## 2021-07-02 LAB
ASCENDING AORTA: 2.26 CM
AV INDEX (PROSTH): 0.93
AV MEAN GRADIENT: 5 MMHG
AV PEAK GRADIENT: 11 MMHG
AV VALVE AREA: 3.64 CM2
AV VELOCITY RATIO: 0.79
BSA FOR ECHO PROCEDURE: 2.26 M2
CV ECHO LV RWT: 0.27 CM
CV STRESS BASE HR: 96 BPM
DIASTOLIC BLOOD PRESSURE: 59 MMHG
DOP CALC AO PEAK VEL: 1.63 M/S
DOP CALC AO VTI: 25.58 CM
DOP CALC LVOT AREA: 3.9 CM2
DOP CALC LVOT DIAMETER: 2.23 CM
DOP CALC LVOT PEAK VEL: 1.29 M/S
DOP CALC LVOT STROKE VOLUME: 93.22 CM3
DOP CALCLVOT PEAK VEL VTI: 23.88 CM
E WAVE DECELERATION TIME: 159.5 MSEC
E/A RATIO: 0.62
E/E' RATIO: 8.27 M/S
ECHO LV POSTERIOR WALL: 0.56 CM (ref 0.6–1.1)
EJECTION FRACTION- HIGH: 65 %
EJECTION FRACTION: 68 %
END DIASTOLIC INDEX-HIGH: 153 ML/M2
END DIASTOLIC INDEX-LOW: 93 ML/M2
END SYSTOLIC INDEX-HIGH: 71 ML/M2
END SYSTOLIC INDEX-LOW: 31 ML/M2
FRACTIONAL SHORTENING: 30 % (ref 28–44)
INTERVENTRICULAR SEPTUM: 0.57 CM (ref 0.6–1.1)
LA MAJOR: 4.22 CM
LA MINOR: 3.82 CM
LA WIDTH: 3.62 CM
LEFT ATRIUM SIZE: 3.97 CM
LEFT ATRIUM VOLUME INDEX MOD: 20.9 ML/M2
LEFT ATRIUM VOLUME INDEX: 22.7 ML/M2
LEFT ATRIUM VOLUME MOD: 45.15 CM3
LEFT ATRIUM VOLUME: 48.99 CM3
LEFT INTERNAL DIMENSION IN SYSTOLE: 2.88 CM (ref 2.1–4)
LEFT VENTRICLE DIASTOLIC VOLUME INDEX: 34.07 ML/M2
LEFT VENTRICLE DIASTOLIC VOLUME: 73.6 ML
LEFT VENTRICLE MASS INDEX: 29 G/M2
LEFT VENTRICLE SYSTOLIC VOLUME INDEX: 14.7 ML/M2
LEFT VENTRICLE SYSTOLIC VOLUME: 31.79 ML
LEFT VENTRICULAR INTERNAL DIMENSION IN DIASTOLE: 4.09 CM (ref 3.5–6)
LEFT VENTRICULAR MASS: 62.02 G
LV LATERAL E/E' RATIO: 7.75 M/S
LV SEPTAL E/E' RATIO: 8.86 M/S
MV A" WAVE DURATION": 8.56 MSEC
MV PEAK A VEL: 1 M/S
MV PEAK E VEL: 0.62 M/S
MV STENOSIS PRESSURE HALF TIME: 46.25 MS
MV VALVE AREA P 1/2 METHOD: 4.76 CM2
OHS CV CPX 1 MINUTE RECOVERY HEART RATE: 99 BPM
OHS CV CPX 85 PERCENT MAX PREDICTED HEART RATE MALE: 144
OHS CV CPX MAX PREDICTED HEART RATE: 170
OHS CV CPX PATIENT IS FEMALE: 1
OHS CV CPX PATIENT IS MALE: 0
OHS CV CPX PEAK DIASTOLIC BLOOD PRESSURE: 48 MMHG
OHS CV CPX PEAK HEAR RATE: 100 BPM
OHS CV CPX PEAK RATE PRESSURE PRODUCT: 8100
OHS CV CPX PEAK SYSTOLIC BLOOD PRESSURE: 81 MMHG
OHS CV CPX PERCENT MAX PREDICTED HEART RATE ACHIEVED: 59
OHS CV CPX RATE PRESSURE PRODUCT PRESENTING: NORMAL
PULM VEIN S/D RATIO: 1
PV PEAK D VEL: 0.41 M/S
PV PEAK S VEL: 0.41 M/S
RA MAJOR: 3.48 CM
RA PRESSURE: 3 MMHG
RA WIDTH: 2.68 CM
RETIRED EF AND QEF - SEE NOTES: 53 %
RIGHT VENTRICULAR END-DIASTOLIC DIMENSION: 3.82 CM
RV TISSUE DOPPLER FREE WALL SYSTOLIC VELOCITY 1 (APICAL 4 CHAMBER VIEW): 24.07 CM/S
SINUS: 2.35 CM
STJ: 2.31 CM
SYSTOLIC BLOOD PRESSURE: 109 MMHG
TDI LATERAL: 0.08 M/S
TDI SEPTAL: 0.07 M/S
TDI: 0.08 M/S
TRICUSPID ANNULAR PLANE SYSTOLIC EXCURSION: 2.03 CM

## 2021-07-02 PROCEDURE — 93018 STRESS TEST WITH MYOCARDIAL PERFUSION (CUPID ONLY): ICD-10-PCS | Mod: TXP,,, | Performed by: INTERNAL MEDICINE

## 2021-07-02 PROCEDURE — 77067 SCR MAMMO BI INCL CAD: CPT | Mod: TC,TXP

## 2021-07-02 PROCEDURE — 72170 XR PELVIS ROUTINE AP: ICD-10-PCS | Mod: 26,TXP,, | Performed by: RADIOLOGY

## 2021-07-02 PROCEDURE — 99214 PR OFFICE/OUTPT VISIT, EST, LEVL IV, 30-39 MIN: ICD-10-PCS | Mod: S$PBB,TXP,, | Performed by: NURSE PRACTITIONER

## 2021-07-02 PROCEDURE — 93018 CV STRESS TEST I&R ONLY: CPT | Mod: TXP,,, | Performed by: INTERNAL MEDICINE

## 2021-07-02 PROCEDURE — 93306 TTE W/DOPPLER COMPLETE: CPT | Mod: 26,TXP,, | Performed by: INTERNAL MEDICINE

## 2021-07-02 PROCEDURE — 99215 OFFICE O/P EST HI 40 MIN: CPT | Mod: PBBFAC,25,TXP | Performed by: NURSE PRACTITIONER

## 2021-07-02 PROCEDURE — 93016 CV STRESS TEST SUPVJ ONLY: CPT | Mod: TXP,,, | Performed by: INTERNAL MEDICINE

## 2021-07-02 PROCEDURE — 77067 MAMMO DIGITAL SCREENING BILAT: ICD-10-PCS | Mod: 26,TXP,, | Performed by: RADIOLOGY

## 2021-07-02 PROCEDURE — 76770 US EXAM ABDO BACK WALL COMP: CPT | Mod: 26,TXP,, | Performed by: RADIOLOGY

## 2021-07-02 PROCEDURE — 72170 X-RAY EXAM OF PELVIS: CPT | Mod: 26,TXP,, | Performed by: RADIOLOGY

## 2021-07-02 PROCEDURE — 72170 X-RAY EXAM OF PELVIS: CPT | Mod: TC,TXP

## 2021-07-02 PROCEDURE — 93306 TTE W/DOPPLER COMPLETE: CPT | Mod: TXP

## 2021-07-02 PROCEDURE — 93017 CV STRESS TEST TRACING ONLY: CPT | Mod: TXP

## 2021-07-02 PROCEDURE — 76770 US RETROPERITONEAL COMPLETE: ICD-10-PCS | Mod: 26,TXP,, | Performed by: RADIOLOGY

## 2021-07-02 PROCEDURE — 78452 HT MUSCLE IMAGE SPECT MULT: CPT | Mod: 26,TXP,, | Performed by: INTERNAL MEDICINE

## 2021-07-02 PROCEDURE — 93016 STRESS TEST WITH MYOCARDIAL PERFUSION (CUPID ONLY): ICD-10-PCS | Mod: TXP,,, | Performed by: INTERNAL MEDICINE

## 2021-07-02 PROCEDURE — 99999 PR PBB SHADOW E&M-EST. PATIENT-LVL V: ICD-10-PCS | Mod: PBBFAC,TXP,, | Performed by: NURSE PRACTITIONER

## 2021-07-02 PROCEDURE — 78452 STRESS TEST WITH MYOCARDIAL PERFUSION (CUPID ONLY): ICD-10-PCS | Mod: 26,TXP,, | Performed by: INTERNAL MEDICINE

## 2021-07-02 PROCEDURE — 93306 ECHO (CUPID ONLY): ICD-10-PCS | Mod: 26,TXP,, | Performed by: INTERNAL MEDICINE

## 2021-07-02 PROCEDURE — 63600175 PHARM REV CODE 636 W HCPCS: Mod: TXP | Performed by: NURSE PRACTITIONER

## 2021-07-02 PROCEDURE — 76770 US EXAM ABDO BACK WALL COMP: CPT | Mod: TC,TXP

## 2021-07-02 PROCEDURE — 99214 OFFICE O/P EST MOD 30 MIN: CPT | Mod: S$PBB,TXP,, | Performed by: NURSE PRACTITIONER

## 2021-07-02 PROCEDURE — 77067 SCR MAMMO BI INCL CAD: CPT | Mod: 26,TXP,, | Performed by: RADIOLOGY

## 2021-07-02 PROCEDURE — A9502 TC99M TETROFOSMIN: HCPCS | Mod: TXP

## 2021-07-02 PROCEDURE — 99999 PR PBB SHADOW E&M-EST. PATIENT-LVL V: CPT | Mod: PBBFAC,TXP,, | Performed by: NURSE PRACTITIONER

## 2021-07-02 RX ORDER — REGADENOSON 0.08 MG/ML
0.4 INJECTION, SOLUTION INTRAVENOUS ONCE
Status: COMPLETED | OUTPATIENT
Start: 2021-07-02 | End: 2021-07-02

## 2021-07-02 RX ADMIN — REGADENOSON 0.4 MG: 0.08 INJECTION, SOLUTION INTRAVENOUS at 11:07

## 2021-10-07 ENCOUNTER — SOCIAL WORK (OUTPATIENT)
Dept: TRANSPLANT | Facility: CLINIC | Age: 41
End: 2021-10-07

## 2022-01-25 LAB
LEFT EYE DM RETINOPATHY: POSITIVE
RIGHT EYE DM RETINOPATHY: POSITIVE

## 2022-02-08 ENCOUNTER — HISTORICAL (OUTPATIENT)
Dept: GASTROENTEROLOGY | Facility: CLINIC | Age: 42
End: 2022-02-08

## 2022-02-09 ENCOUNTER — HISTORICAL (OUTPATIENT)
Dept: ENDOSCOPY | Facility: HOSPITAL | Age: 42
End: 2022-02-09

## 2022-02-09 LAB
CBG: 164 (ref 70–115)
HEMOLYSIS INTERF INDEX SERPL-ACNC: 1
POTASSIUM SERPL-SCNC: 3.9 MMOL/L (ref 3.5–5.1)
SARS-COV-2 AG RESP QL IA.RAPID: NEGATIVE

## 2022-03-09 LAB
Lab: NEGATIVE
Lab: POSITIVE
PAP RECOMMENDATION EXT: ABNORMAL
PAP SMEAR: ABNORMAL
TRICHOMONAS VAGINALIS: NEGATIVE

## 2022-03-11 ENCOUNTER — HISTORICAL (OUTPATIENT)
Dept: RADIOLOGY | Facility: HOSPITAL | Age: 42
End: 2022-03-11

## 2022-04-11 ENCOUNTER — HISTORICAL (OUTPATIENT)
Dept: ADMINISTRATIVE | Facility: HOSPITAL | Age: 42
End: 2022-04-11
Payer: MEDICARE

## 2022-04-27 VITALS
BODY MASS INDEX: 37.91 KG/M2 | HEIGHT: 66 IN | DIASTOLIC BLOOD PRESSURE: 84 MMHG | OXYGEN SATURATION: 99 % | WEIGHT: 235.88 LBS | SYSTOLIC BLOOD PRESSURE: 132 MMHG

## 2022-04-30 NOTE — OP NOTE
DATE OF SURGERY:    12/19/2018    SURGEON:  Kali Sue MD    PREOPERATIVE DIAGNOSIS:  End-stage renal disease on dialysis.    POSTOPERATIVE DIAGNOSIS:  End-stage renal disease on dialysis.    ANESTHESIA:  Local, plus MAC.    PROCEDURE PERFORMED:  Insertion of tunneled hemo catheter via right internal jugular vein.    FINDINGS:    1. 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  2. A temporary catheter in the right side of the neck.  3. AV fistula of the left forearm that had not matured yet.    DESCRIPTION OF PROCEDURE:  The patient was brought to surgery in supine position.  Timeout was called.  Upper chest and neck were cleansed and draped and then the left internal jugular vein was located via percutaneous puncture, but I was not able to advance the wire beyond the  base of the neck.  On the right side, the catheter was removed over the wire.  The wire was advanced until reaching the right atrium.  The wound was enlarged, then using the peel-away introducer, a Palindrome catheter 23 cm from tip to cuff was inserted, advancing it until it reached the superior vena cava and atrial junction.  The distal end of the catheter was exteriorized in the right infraclavicular area across to the anterior axillary line.  Affixed to the skin with 2 stitches and the small incision on the neck was debrided and approximated with Vicryl.     The lumen of the catheter packed with heparin 1000 units per mL.        ______________________________  Kali Sue MD    JRP/UM  DD:  12/19/2018  Time:  11:38AM  DT:  12/19/2018  Time:  01:30PM  Job #:  [id number]

## 2022-05-02 ENCOUNTER — PROCEDURE VISIT (OUTPATIENT)
Dept: GASTROENTEROLOGY | Facility: CLINIC | Age: 42
End: 2022-05-02
Payer: MEDICARE

## 2022-05-02 VITALS
SYSTOLIC BLOOD PRESSURE: 126 MMHG | OXYGEN SATURATION: 99 % | DIASTOLIC BLOOD PRESSURE: 88 MMHG | TEMPERATURE: 98 F | RESPIRATION RATE: 16 BRPM | HEART RATE: 76 BPM | HEIGHT: 65 IN | WEIGHT: 209.44 LBS | BODY MASS INDEX: 34.89 KG/M2

## 2022-05-02 DIAGNOSIS — K64.8 INTERNAL HEMORRHOIDS WITHOUT COMPLICATION: Primary | ICD-10-CM

## 2022-05-02 DIAGNOSIS — K64.9 HEMORRHOIDS, UNSPECIFIED HEMORRHOID TYPE: ICD-10-CM

## 2022-05-02 PROCEDURE — 46221 LIGATION OF HEMORRHOID(S): CPT | Mod: S$PBB,NTX,, | Performed by: NURSE PRACTITIONER

## 2022-05-02 PROCEDURE — 46221 PR HEMORRHOIDECTOMY INTERNAL RUBBER BAND LIGATIONS: ICD-10-PCS | Mod: S$PBB,NTX,, | Performed by: NURSE PRACTITIONER

## 2022-05-02 PROCEDURE — 46221 LIGATION OF HEMORRHOID(S): CPT | Mod: PBBFAC,NTX | Performed by: NURSE PRACTITIONER

## 2022-05-02 NOTE — PROCEDURES
"Procedures   Subjective:       Korey Mcgrath is a 41 y.o. AA female who presents for third hemorrhoidal banding procedure. She is doing well since her second hemorrhoid banding. She denies melena or hematochezia. She continues to take Linzess as needed with good relief noted.     Review of Systems   Negative except as noted in the HPI.    Objective:      /88 (Patient Position: Sitting)   Pulse 76   Temp 97.9 °F (36.6 °C)   Resp 16   Ht 5' 5" (1.651 m)   Wt 95 kg (209 lb 7 oz)   LMP 04/18/2022 (Exact Date)   SpO2 99%   BMI 34.85 kg/m²   Rectal:     The procedure, indications, preparation and potential complications were explained to the patient, who indicated understanding and signed the corresponding consent form. The patient was noted to have skin tags. The patient's hemorrhoid was grade 2. Using the [place] band ligator device, a single band was placed in the right anterior position. The patient was observed and assessed for several minutes after the band placement. No complications noted. Post hemorrhoid banding instructions given.    Assessment:        Internal hemorrhoids without complication  -     Anal Banding; Future        Plan:      Recommend soluble fiber supplementation Avoid straining or sitting on the toilet for long periods of time  Continue Linzess 145 mcg daily  Call with updates  F/u clinic visit with NP in 3 months  "

## 2022-05-05 NOTE — HISTORICAL [facility]
This is a historical note converted from Yong. Formatting and pictures may have been removed.  Please reference Yong for original formatting and attached multimedia. Chief Complaint  Surgery clearance for eye surgery  History of Present Illness  38 yo bf for cataract sx clearence, htn, dm, esrd on dialysis, chol, gerd  Review of Systems  neg cv, neg pulm, neg gi  Physical Exam  Vitals & Measurements  T:?36.7? ?C (Oral)? HR:?80(Peripheral)? RR:?20? BP:?159/82?  HT:?167?cm? WT:?103.7?kg? BMI:?37.18?  aax4 nad  carri eomi  cv rrr s1s2 no mgr  lungs cta no cr or whz  abd nt soft  ext no edema  neuro intact  Assessment/Plan  1.?CRF (chronic renal failure)?N18.9  ?on dialysis  Ordered:  CBC w/ Auto Diff, Routine collect, *Est. 08/23/19 3:00:00 CDT, Blood, Order for future visit, *Est. Stop date 08/23/19 3:00:00 CDT, Lab Collect, CRF (chronic renal failure)  Chronic GERD  Diabetes mellitus, insulin dependent (IDDM), uncontrolled  Obesity, 08/23/19 8...  Clinic Follow up, *Est. 02/23/20 3:00:00 CST, Order for future visit, CRF (chronic renal failure)  Chronic GERD  Diabetes mellitus, insulin dependent (IDDM), uncontrolled  Obesity, [facility]  Comprehensive Metabolic Panel, Routine collect, *Est. 08/23/19 3:00:00 CDT, Blood, Order for future visit, *Est. Stop date 08/23/19 3:00:00 CDT, Lab Collect, CRF (chronic renal failure)  Chronic GERD  Diabetes mellitus, insulin dependent (IDDM), uncontrolled  Obesity, 08/23/19 8...  Electrocardiogram Adult 12 Lead, 08/23/19 8:03:00 CDT, Routine, 08/23/19 8:03:00 CDT, Ambulatory, Patient Has IV, Standard Precautions, Orders for Future Visit, -1, -1, 08/23/19 8:03:00 CDT, CRF (chronic renal failure)  Chronic GERD  Diabetes mellitus, insulin dependent (IDDM), unc...  Hemoglobin A1C [facility], Routine collect, *Est. 08/23/19 3:00:00 CDT, Blood, Order for future visit, *Est. Stop date 08/23/19 3:00:00 CDT, Lab Collect, CRF (chronic renal failure)  Chronic GERD  Diabetes  mellitus, insulin dependent (IDDM), uncontrolled  Obesity, 08/23/19 8...  Office/Outpatient Visit Level 3 Established [id number] PC, CRF (chronic renal failure)  Chronic GERD  Diabetes mellitus, insulin dependent (IDDM), uncontrolled  Obesity, [facility], 08/23/19 8:04:00 CDT  Thyroid Stimulating Hormone, Routine collect, *Est. 08/23/19 3:00:00 CDT, Blood, Order for future visit, *Est. Stop date 08/23/19 3:00:00 CDT, Lab Collect, CRF (chronic renal failure)  Chronic GERD  Diabetes mellitus, insulin dependent (IDDM), uncontrolled  Obesity, 08/23/19 8...  XR Chest 2 Views, Routine, *Est. 08/23/19 3:00:00 CDT, Other (please specify), None, Ambulatory, Patient Has IV?, preop, Rad Type, Order for future visit, CRF (chronic renal failure)  Chronic GERD  Diabetes mellitus, insulin dependent (IDDM), uncontrolled  Obesity ...  ?  2.?Chronic GERD?K21.9  ?stable  Ordered:  CBC w/ Auto Diff, Routine collect, *Est. 08/23/19 3:00:00 CDT, Blood, Order for future visit, *Est. Stop date 08/23/19 3:00:00 CDT, Lab Collect, CRF (chronic renal failure)  Chronic GERD  Diabetes mellitus, insulin dependent (IDDM), uncontrolled  Obesity, 08/23/19 8...  Clinic Follow up, *Est. 02/23/20 3:00:00 CST, Order for future visit, CRF (chronic renal failure)  Chronic GERD  Diabetes mellitus, insulin dependent (IDDM), uncontrolled  Obesity, [facility]  Comprehensive Metabolic Panel, Routine collect, *Est. 08/23/19 3:00:00 CDT, Blood, Order for future visit, *Est. Stop date 08/23/19 3:00:00 CDT, Lab Collect, CRF (chronic renal failure)  Chronic GERD  Diabetes mellitus, insulin dependent (IDDM), uncontrolled  Obesity, 08/23/19 8...  Electrocardiogram Adult 12 Lead, 08/23/19 8:03:00 CDT, Routine, 08/23/19 8:03:00 CDT, Ambulatory, Patient Has IV, Standard Precautions, Orders for Future Visit, -1, -1, 08/23/19 8:03:00 CDT, CRF (chronic renal failure)  Chronic GERD  Diabetes mellitus, insulin dependent (IDDM), unc...  Hemoglobin A1C  [facility], Routine collect, *Est. 08/23/19 3:00:00 CDT, Blood, Order for future visit, *Est. Stop date 08/23/19 3:00:00 CDT, Lab Collect, CRF (chronic renal failure)  Chronic GERD  Diabetes mellitus, insulin dependent (IDDM), uncontrolled  Obesity, 08/23/19 8...  Office/Outpatient Visit Level 3 Established [id number] PC, CRF (chronic renal failure)  Chronic GERD  Diabetes mellitus, insulin dependent (IDDM), uncontrolled  Obesity, [facility], 08/23/19 8:04:00 CDT  Thyroid Stimulating Hormone, Routine collect, *Est. 08/23/19 3:00:00 CDT, Blood, Order for future visit, *Est. Stop date 08/23/19 3:00:00 CDT, Lab Collect, CRF (chronic renal failure)  Chronic GERD  Diabetes mellitus, insulin dependent (IDDM), uncontrolled  Obesity, 08/23/19 8...  XR Chest 2 Views, Routine, *Est. 08/23/19 3:00:00 CDT, Other (please specify), None, Ambulatory, Patient Has IV?, preop, Rad Type, Order for future visit, CRF (chronic renal failure)  Chronic GERD  Diabetes mellitus, insulin dependent (IDDM), uncontrolled  Obesity ...  ?  3.?Diabetes mellitus, insulin dependent (IDDM), uncontrolled?E10.65  ?a1c  Ordered:  CBC w/ Auto Diff, Routine collect, *Est. 08/23/19 3:00:00 CDT, Blood, Order for future visit, *Est. Stop date 08/23/19 3:00:00 CDT, Lab Collect, CRF (chronic renal failure)  Chronic GERD  Diabetes mellitus, insulin dependent (IDDM), uncontrolled  Obesity, 08/23/19 8...  Clinic Follow up, *Est. 02/23/20 3:00:00 CST, Order for future visit, CRF (chronic renal failure)  Chronic GERD  Diabetes mellitus, insulin dependent (IDDM), uncontrolled  Obesity, [facility]  Comprehensive Metabolic Panel, Routine collect, *Est. 08/23/19 3:00:00 CDT, Blood, Order for future visit, *Est. Stop date 08/23/19 3:00:00 CDT, Lab Collect, CRF (chronic renal failure)  Chronic GERD  Diabetes mellitus, insulin dependent (IDDM), uncontrolled  Obesity, 08/23/19 8...  Electrocardiogram Adult 12 Lead, 08/23/19 8:03:00 CDT, Routine, 08/23/19  8:03:00 CDT, Ambulatory, Patient Has IV, Standard Precautions, Orders for Future Visit, -1, -1, 08/23/19 8:03:00 CDT, CRF (chronic renal failure)  Chronic GERD  Diabetes mellitus, insulin dependent (IDDM), unc...  Hemoglobin A1C [facility], Routine collect, *Est. 08/23/19 3:00:00 CDT, Blood, Order for future visit, *Est. Stop date 08/23/19 3:00:00 CDT, Lab Collect, CRF (chronic renal failure)  Chronic GERD  Diabetes mellitus, insulin dependent (IDDM), uncontrolled  Obesity, 08/23/19 8...  Office/Outpatient Visit Level 3 Established [id number] PC, CRF (chronic renal failure)  Chronic GERD  Diabetes mellitus, insulin dependent (IDDM), uncontrolled  Obesity, [facility], 08/23/19 8:04:00 CDT  Thyroid Stimulating Hormone, Routine collect, *Est. 08/23/19 3:00:00 CDT, Blood, Order for future visit, *Est. Stop date 08/23/19 3:00:00 CDT, Lab Collect, CRF (chronic renal failure)  Chronic GERD  Diabetes mellitus, insulin dependent (IDDM), uncontrolled  Obesity, 08/23/19 8...  XR Chest 2 Views, Routine, *Est. 08/23/19 3:00:00 CDT, Other (please specify), None, Ambulatory, Patient Has IV?, preop, Rad Type, Order for future visit, CRF (chronic renal failure)  Chronic GERD  Diabetes mellitus, insulin dependent (IDDM), uncontrolled  Obesity ...  ?  4.?Obesity?E66.9  ?dash  Ordered:  CBC w/ Auto Diff, Routine collect, *Est. 08/23/19 3:00:00 CDT, Blood, Order for future visit, *Est. Stop date 08/23/19 3:00:00 CDT, Lab Collect, CRF (chronic renal failure)  Chronic GERD  Diabetes mellitus, insulin dependent (IDDM), uncontrolled  Obesity, 08/23/19 8...  Clinic Follow up, *Est. 02/23/20 3:00:00 CST, Order for future visit, CRF (chronic renal failure)  Chronic GERD  Diabetes mellitus, insulin dependent (IDDM), uncontrolled  Obesity, [facility]  Comprehensive Metabolic Panel, Routine collect, *Est. 08/23/19 3:00:00 CDT, Blood, Order for future visit, *Est. Stop date 08/23/19 3:00:00 CDT, Lab Collect, CRF (chronic renal  failure)  Chronic GERD  Diabetes mellitus, insulin dependent (IDDM), uncontrolled  Obesity, 08/23/19 8...  Electrocardiogram Adult 12 Lead, 08/23/19 8:03:00 CDT, Routine, 08/23/19 8:03:00 CDT, Ambulatory, Patient Has IV, Standard Precautions, Orders for Future Visit, -1, -1, 08/23/19 8:03:00 CDT, CRF (chronic renal failure)  Chronic GERD  Diabetes mellitus, insulin dependent (IDDM), unc...  Hemoglobin A1C [facility], Routine collect, *Est. 08/23/19 3:00:00 CDT, Blood, Order for future visit, *Est. Stop date 08/23/19 3:00:00 CDT, Lab Collect, CRF (chronic renal failure)  Chronic GERD  Diabetes mellitus, insulin dependent (IDDM), uncontrolled  Obesity, 08/23/19 8...  Office/Outpatient Visit Level 3 Established [id number] PC, CRF (chronic renal failure)  Chronic GERD  Diabetes mellitus, insulin dependent (IDDM), uncontrolled  Obesity, [facility], 08/23/19 8:04:00 CDT  Thyroid Stimulating Hormone, Routine collect, *Est. 08/23/19 3:00:00 CDT, Blood, Order for future visit, *Est. Stop date 08/23/19 3:00:00 CDT, Lab Collect, CRF (chronic renal failure)  Chronic GERD  Diabetes mellitus, insulin dependent (IDDM), uncontrolled  Obesity, 08/23/19 8...  XR Chest 2 Views, Routine, *Est. 08/23/19 3:00:00 CDT, Other (please specify), None, Ambulatory, Patient Has IV?, preop, Rad Type, Order for future visit, CRF (chronic renal failure)  Chronic GERD  Diabetes mellitus, insulin dependent (IDDM), uncontrolled  Obesity ...  ?  Referrals  Clinic Follow up, *Est. 02/23/20 3:00:00 CST, Order for future visit, CRF (chronic renal failure)  Chronic GERD  Diabetes mellitus, insulin dependent (IDDM), uncontrolled  Obesity, [facility]   Problem List/Past Medical History  Ongoing  Chronic GERD  CRF (chronic renal failure)  Diabetes mellitus, insulin dependent (IDDM), uncontrolled  Obesity  Historical  Diabetes  Hyperlipemia  Hypertension  Procedure/Surgical History  Catheter Insertion Palindrome (.) (12/19/2018)  Insertion of  Infusion Device into Superior Vena Cava, Percutaneous Approach (2018)  Insertion of tunneled centrally inserted central venous catheter, without subcutaneous port or pump; age 5 years or older (2018)  Catheter Insertion Palindrome (.) (2018)  Catheter Removal Vascular Access (.) (2018)  Insertion of Infusion Device into Right Internal Jugular Vein, Percutaneous Approach (2018)  Removal of Infusion Device from Upper Vein, External Approach (2018)  Ultrasonography of Heart with Aorta (2018)  Catheter Insertion Palindrome (.) (2018)  Fluoroscopy of Right Heart using Other Contrast (2018)  Insertion of Infusion Device into Right Atrium, Percutaneous Approach (2018)  Insertion of Tunneled Vascular Access Device into Chest Subcutaneous Tissue and Fascia, Percutaneous Approach (2018)   section (2008)   SECTION (2004)  Tubal ligation   Medications  cetirizine 10 mg oral tablet, 10 mg= 1 tab(s), Oral, Daily, 11 refills  Durezol 0.05% ophthalmic emulsion, 1 drop(s), Eye-Both, QID  Epogen 20,000 units/mL injectable (Non-ESRD), Subcutaneous, qTThSa,? ?Not Taking, Completed Rx: Last Dose Date/Time Unknown  furosemide 20 mg oral tablet, 20 mg= 1 tab(s), Oral, Daily,? ?Not Taking per Prescriber  glucometer, lancets, strips, See Instructions  hydrALAZINE 25 mg oral tablet, See Instructions, 3 refills  Ilevro 0.3% ophthalmic suspension, 1 drop(s), Eye-Both, Daily  insulin glargine 100 units/mL subcutaneous solution, 10 units, Subcutaneous, Once a day (at bedtime), 11 refills  lactulose 10 g/15 mL oral syrup, 10 gm= 15 mL, Oral, BID, 5 refills  lidocaine-prilocaine topical 2.5%-2.5% cr., 1 nikhil, TOP, Once  losartan 100 mg oral tablet, 100 mg= 1 tab(s), Oral, Daily, 11 refills  Metoprolol Tartrate 50 mg oral tablet, See Instructions, 11 refills  Norvasc 10 mg oral tablet, 10 mg= 1 tab(s), Oral, Daily, 3 refills  NOVOLOG INJ FLEXPEN  ofloxacin  0.3% ophthalmic solution, 1 drop(s), Eye-Both, QID  Pantoprazole 40 mg ORAL EC-Tablet, 40 mg= 1 tab(s), Oral, Daily, 3 refills  pravastatin 80 mg oral tablet, 80 mg= 1 tab(s), Oral, Daily, 3 refills  sevelamer carbonate 800 mg oral tablet, 800 mg= 1 tab(s), Oral,? ?Not taking: Last Dose Date/Time unknown  traMADol 50 mg oral tablet, 50 mg= 1 tab(s), Oral, TID,? ?Not taking: Last Dose Date/Time unknown  traZODone 100 mg oral tablet, 100 mg= 1 tab(s), Oral, Once a day (at bedtime), 11 refills  Zofran ODT 4 mg oral tablet, disintegrating, 4 mg= 1 tab(s), Oral, q8hr, PRN  Allergies  Macrobid  ibuprofen?(rash/hives)  Social History  Abuse/Neglect  No, No, Yes, 07/19/2019  Alcohol - Denies Alcohol Use, 04/08/2015  Never, 04/08/2015  Employment/School  Employed, Activity level: Occasional physical work. Workplace hazards: Repetitive motion., 08/19/2015  Exercise  Exercise frequency: Daily. Exercise type: Walking, Aerobics, Running, RIDE BIKE ., 08/19/2015  Home/Environment  Lives with Alone, Children. Living situation: Home/Independent. Home equipment: Glucose monitoring, B/P CUFF . Alcohol abuse in household: No. Substance abuse in household: No. Smoker in household: No. Feels unsafe at home: No. Safe place to go: Yes. Family/Friends available for support: Yes. Concern for family members at home: No. Major illness in household: No., 08/19/2015  Nutrition/Health  Regular, Wants to lose weight: Yes. Sleeping concerns: No. Feels highly stressed: No., 10/18/2018  Sexual  Sexually active: Yes. Sexually active at age 17 Years. Number of current partners 1. Number of lifetime partners 3. Sexual orientation: Heterosexual. Uses condoms: No. History of sexual abuse: No., 09/22/2017  Substance Use - Denies Substance Abuse, 04/08/2015  Never, 07/26/2016  Tobacco - Denies Tobacco Use, 04/08/2015  Never (less than 100 in lifetime), N/A, 08/23/2019  Never (less than 100 in lifetime), N/A, 07/19/2019  Family History  Hypertension.:  Father.  Immunizations  Vaccine Date Status Comments   tetanus/diphtheria/pertussis, acel(Tdap) 04/03/2019 Recorded 2019-08-23: VIS DATE: 02/24/2015   influenza virus vaccine, inactivated 12/18/2015 Given    Health Maintenance  Health Maintenance  ???Pending?(in the next year)  ??? ??OverDue  ??? ? ? ?Diabetes Maintenance-Fasting Lipid Profile due??03/09/18??and every 1??year(s)  ??? ? ? ?Diabetes Maintenance-HgbA1c due??03/09/18??and every 1??year(s)  ??? ? ? ?Alcohol Misuse Screening due??01/01/19??and every 1??year(s)  ??? ??Due?  ??? ? ? ?Cervical Cancer Screening due??07/27/19??and every 3??year(s)  ??? ? ? ?Diabetes Maintenance-Eye Exam due??08/23/19??and every?  ??? ? ? ?Diabetes Maintenance-Foot Exam due??08/23/19??and every?  ??? ? ? ?Influenza Vaccine due??08/23/19??and every?  ??? ??Due In Future?  ??? ? ? ?Obesity Screening not due until??01/01/20??and every 1??year(s)  ??? ? ? ?Blood Pressure Screening not due until??08/22/20??and every 1??year(s)  ??? ? ? ?Body Mass Index Check not due until??08/22/20??and every 1??year(s)  ??? ? ? ?Depression Screening not due until??08/22/20??and every 1??year(s)  ???Satisfied?(in the past 1 year)  ??? ??Satisfied?  ??? ? ? ?ADL Screening on??08/23/19.??Satisfied by Dawna Neff LPN  ??? ? ? ?Blood Pressure Screening on??08/23/19.??Satisfied by Dawna Neff LPN  ??? ? ? ?Body Mass Index Check on??08/23/19.??Satisfied by Dawna Neff LPN  ??? ? ? ?Depression Screening on??08/23/19.??Satisfied by Dawna Neff LPN  ??? ? ? ?Diabetes Screening on??12/19/18.??Satisfied by Harjit Zapata  ??? ? ? ?Hypertension Management-Blood Pressure on??08/23/19.??Satisfied by Dawna Neff LPN  ??? ? ? ?Influenza Vaccine on??12/21/18.??Satisfied by Dawna Neff LPN  ??? ? ? ?Obesity Screening on??08/23/19.??Satisfied by Dawna Neff LPN  ??? ? ? ?Tetanus Vaccine on??04/03/19.??Satisfied by Dawna Neff LPN  Gemini  ?      a1c 9.1 pt is cleared for cataract sx

## 2022-05-05 NOTE — HISTORICAL [facility]
This is a historical note converted from Yong. Formatting and pictures may have been removed.  Please reference Yong for original formatting and attached multimedia. Chief Complaint  cataract preop  Physical Exam  Vitals & Measurements  T:?36.5? ?C (Oral)? HR:?80(Peripheral)? RR:?18? BP:?174/84?  HT:?167?cm? WT:?101.7?kg? BMI:?36.47? LMP:?10/02/2019 00:00 CDT?  Assessment/Plan  Orders:  CBC w/ Auto Diff, Routine collect, *Est. 10/23/19 3:00:00 CDT, Blood, Order for future visit, *Est. Stop date 10/23/19 3:00:00 CDT, Lab Collect, Preop testing, 10/23/19 10:54:00 CDT  Comprehensive Metabolic Panel, Routine collect, *Est. 10/23/19 3:00:00 CDT, Blood, Order for future visit, *Est. Stop date 10/23/19 3:00:00 CDT, Lab Collect, Preop testing, 10/23/19 10:54:00 CDT  Electrocardiogram Adult 12 Lead, 10/23/19 10:54:00 CDT, Routine, 10/23/19 10:54:00 CDT, Ambulatory, Patient Has IV, Standard Precautions, Orders for Future Visit, -1, -1, 10/23/19 10:54:00 CDT, Preop testing  Hemoglobin A1C [facility], Routine collect, *Est. 10/23/19 3:00:00 CDT, Blood, Order for future visit, *Est. Stop date 10/23/19 3:00:00 CDT, Lab Collect, Preop testing, 10/23/19 10:54:00 CDT  XR Chest 2 Views, Routine, *Est. 10/23/19 3:00:00 CDT, Other (please specify), None, Ambulatory, Patient Has IV?, Rad Type, Order for future visit, Preop testing, Not Scheduled, *Est. 10/23/19 3:00:00 CDT   Problem List/Past Medical History  Ongoing  Chronic GERD  CRF (chronic renal failure)  Diabetes mellitus, insulin dependent (IDDM), uncontrolled  Obesity  Historical  Diabetes  Hyperlipemia  Hypertension  Pregnant  Pregnant  Pregnant  Pregnant  Procedure/Surgical History  Cataract surgery (2019)  Catheter Insertion Palindrome (.) (12/19/2018)  Insertion of Infusion Device into Superior Vena Cava, Percutaneous Approach (12/19/2018)  Insertion of tunneled centrally inserted central venous catheter, without subcutaneous port or pump; age 5 years or older  (2018)  Catheter Insertion Palindrome (.) (2018)  Catheter Removal Vascular Access (.) (2018)  Insertion of Infusion Device into Right Internal Jugular Vein, Percutaneous Approach (2018)  Removal of Infusion Device from Upper Vein, External Approach (2018)  Ultrasonography of Heart with Aorta (2018)  Catheter Insertion Palindrome (.) (2018)  Fluoroscopy of Right Heart using Other Contrast (2018)  Insertion of Infusion Device into Right Atrium, Percutaneous Approach (2018)  Insertion of Tunneled Vascular Access Device into Chest Subcutaneous Tissue and Fascia, Percutaneous Approach (2018)   section (2008)   SECTION (2004)  Tubal ligation   Medications  cetirizine 10 mg oral tablet, 10 mg= 1 tab(s), Oral, Daily, 11 refills  Durezol 0.05% ophthalmic emulsion, 1 drop(s), Eye-Both, QID,? ?Not taking: Last Dose Date/Time unknown  furosemide 20 mg oral tablet, 20 mg= 1 tab(s), Oral, Daily  glucometer, lancets, strips, See Instructions  hydrALAZINE 25 mg oral tablet, See Instructions, 3 refills  Ilevro 0.3% ophthalmic suspension, 1 drop(s), Eye-Both, Daily  insulin glargine 100 units/mL subcutaneous solution, 15 units, Subcutaneous, Once a day (at bedtime), 11 refills  lactulose 10 g/15 mL oral syrup, 10 gm= 15 mL, Oral, Daily  lidocaine-prilocaine topical 2.5%-2.5% cr., 1 nikhil, TOP, Once,? ?Not taking: Last Dose Date/Time unknown  losartan 100 mg oral tablet, 100 mg= 1 tab(s), Oral, Daily, 11 refills  Metoprolol Tartrate 50 mg oral tablet, See Instructions, 11 refills  MetroGel-Vaginal 0.75% vaginal gel with applicator, 1 nikhil, VAG, Once a day (at bedtime)  Norvasc 10 mg oral tablet, 10 mg= 1 tab(s), Oral, Daily, 3 refills  NOVOLOG INJ FLEXPEN  ofloxacin 0.3% ophthalmic solution, 1 drop(s), Eye-Both, QID  Pantoprazole 40 mg ORAL EC-Tablet, 40 mg= 1 tab(s), Oral, Daily, 3 refills  pravastatin 80 mg oral tablet, 80 mg= 1 tab(s), Oral,  Daily, 3 refills  sevelamer carbonate 800 mg oral tablet, 800 mg= 1 tab(s), Oral  traZODone 100 mg oral tablet, 100 mg= 1 tab(s), Oral, Once a day (at bedtime), 11 refills  Zofran ODT 4 mg oral tablet, disintegrating, 4 mg= 1 tab(s), Oral, q8hr, PRN  Allergies  Macrobid  ibuprofen?(rash/hives)  Social History  Abuse/Neglect  No, No, 10/21/2019  No, No, Yes, 07/19/2019  Alcohol - Denies Alcohol Use, 04/08/2015  Never, 04/08/2015  Employment/School  Employed, Activity level: Occasional physical work. Workplace hazards: Repetitive motion., 08/19/2015  Exercise  Exercise frequency: Daily. Exercise type: Walking, Aerobics, Running, RIDE BIKE ., 08/19/2015  Home/Environment  Lives with Alone, Children. Living situation: Home/Independent. Home equipment: Glucose monitoring, B/P CUFF . Alcohol abuse in household: No. Substance abuse in household: No. Smoker in household: No. Feels unsafe at home: No. Safe place to go: Yes. Family/Friends available for support: Yes. Concern for family members at home: No. Major illness in household: No., 08/19/2015  Nutrition/Health  Regular, Wants to lose weight: Yes. Sleeping concerns: No. Feels highly stressed: No., 10/18/2018  Sexual  Sexually active: Yes. Number of current partners 1. Gender Identity Identifies as female., 10/21/2019  Sexually active: Yes. Sexually active at age 17 Years. Number of current partners 1. Number of lifetime partners 3. Sexual orientation: Heterosexual. Uses condoms: No. History of sexual abuse: No., 09/22/2017  Substance Use - Denies Substance Abuse, 04/08/2015  Never, 07/26/2016  Tobacco - Denies Tobacco Use, 04/08/2015  Never (less than 100 in lifetime), N/A, 10/21/2019  Never (less than 100 in lifetime), N/A, 08/23/2019  Never (less than 100 in lifetime), N/A, 07/19/2019  Family History  Hypertension.: Father.  Immunizations  Vaccine Date Status Comments   tetanus/diphtheria/pertussis, acel(Tdap) 04/03/2019 Recorded 2019-08-23: VIS DATE: 02/24/2015    influenza virus vaccine, inactivated 12/18/2015 Given    Health Maintenance  Health Maintenance  ???Pending?(in the next year)  ??? ??OverDue  ??? ? ? ?Alcohol Misuse Screening due??01/01/19??and every 1??year(s)  ??? ??Due In Future?  ??? ? ? ?Obesity Screening not due until??01/01/20??and every 1??year(s)  ??? ? ? ?ADL Screening not due until??08/23/20??and every 1??year(s)  ???Satisfied?(in the past 1 year)  ??? ??Satisfied?  ??? ? ? ?ADL Screening on??08/23/19.??Satisfied by Dawna Neff LPN  ??? ? ? ?Obesity Screening on??10/23/19.??Satisfied by Gilbert Calloway LPN  ??? ? ? ?Tetanus Vaccine on??04/03/19.??Satisfied by Dawna Neff LPN  ?

## 2022-05-20 NOTE — HISTORICAL [facility]ER
This is a historical note converted from [employer]. Formatting and pictures may have been removed.  Please reference [facility] for original formatting and attached multimedia. History of Present Illness  Ms. Mcgrath is a 41 year old female with DM, ESRD here for a colonoscopy.  ?  This is her first colonoscopy. ?She has chronic constipation?and was started on Linzess 145 mcg daily.? On this medication she has bowel movements daily, soft in consistency. ?She notices?intermittent bright red blood on the tissue when she wipes. ?She does?report hemorrhoids, often feeling as if?some protrusion from her anus and some discomfort with bowel movements as well.? She denies any significant abdominal pain, heartburn, reflux.? Her weight is stable.  ?  Her father had colon polyps.? Her uncle had colon cancer. ?She denies any family history of colon cancer.? She denies N tobacco, alcohol, recreational drug use. ?She is never had any abdominal surgeries?other than  and tubal ligation.  Review of Systems  Comprehensive Review of Systems performed with no exceptions other than as noted in HPI.  ?  Physical Exam  Vitals & Measurements  T:?36.7? ?C (Oral)? HR:?94(Monitored)? RR:?18? BP:?130/94? SpO2:?99%? WT:?102.6?kg? BMI:?37.64?  General:?well-developed well-nourished in no acute distress  Eye: PERRLA, EOMI, clear conjunctiva  HENT:? oropharynx without erythema/exudate, oropharynx and nasal mucosal surfaces moist  Neck:? no thyromegaly or lymphadenopathy, trachea midline  Respiratory:?symmetrical chest expansion and respiratory effort, clear to auscultation bilaterally  Cardiovascular:?regular rate and rhythm without murmurs, gallops or rubs  Gastrointestinal:?soft, non-tender, non-distended with normal bowel sounds, without masses to palpation  Integumentary: no rashes or skin lesions present  Neurologic: cranial nerves intact, no asterixis, awake, alert, and oriented  Psych: good insight, appropriate mood, normal  affect  ?  Assessment/Plan  Ms. Mcgrath is a 41 year old female with DM, ESRD here for a colonoscopy.  ?  Risks, benefits, and alternatives of the procedure discussed.?  Will proceed with endoscopic procedure as scheduled.   Problem List/Past Medical History  Ongoing  Chronic GERD  CRF (chronic renal failure)  Diabetes mellitus, insulin dependent (IDDM), uncontrolled  Obesity  Historical  Diabetes  ESRD - End stage renal disease  Hyperlipemia  Hypertension  Pregnant  Pregnant  Pregnant  Pregnant  Procedure/Surgical History  declotting of graft (2021)  Drainage of Buttock Skin, External Approach (2020)  Incision and drainage of abscess (eg, carbuncle, suppurative hidradenitis, cutaneous or subcutaneous abscess, cyst, furuncle, or paronychia); complicated or multiple (2020)  right arm graft ()  Cataract surgery ()  left arm fistula ()  Catheter Insertion Palindrome (.) (2018)  Insertion of Infusion Device into Superior Vena Cava, Percutaneous Approach (2018)  Insertion of tunneled centrally inserted central venous catheter, without subcutaneous port or pump; age 5 years or older (2018)  Catheter Insertion Palindrome (.) (2018)  Catheter Removal Vascular Access (.) (2018)  Insertion of Infusion Device into Right Internal Jugular Vein, Percutaneous Approach (2018)  Removal of Infusion Device from Upper Vein, External Approach (2018)  Ultrasonography of Heart with Aorta (2018)  Catheter Insertion Palindrome (.) (2018)  Fluoroscopy of Right Heart using Other Contrast (2018)  Insertion of Infusion Device into Right Atrium, Percutaneous Approach (2018)  Insertion of Tunneled Vascular Access Device into Chest Subcutaneous Tissue and Fascia, Percutaneous Approach (2018)   section (2008)   SECTION (2004)  Tubal ligation   Medications  Inpatient  buffered lidocaine 2% - 0.5 ml syringe, 10 mg= 0.5  mL, Subcutaneous, As Directed  IVF Normal Saline NS Infusion 1,000 mL, 1000 mL, IV  Home  acetaminophen  BD Mini pen needles, See Instructions, 11 refills  doxazosin 2 mg oral tablet, 2 mg= 1 tab(s), Oral, Daily, 3 refills  furosemide 40 mg oral tablet, 40 mg= 1 tab(s), Oral, Daily  glucometer, lancets, strips, See Instructions  insulin glargine 100 units/mL subcutaneous solution, 20 units, Subcutaneous, Once a day (at bedtime), 6 refills  lactulose 10 g/15 mL oral syrup, 10 gm= 15 mL, Oral, Daily  Linzess 145 mcg oral capsule, 145 mcg= 1 cap(s), Oral, Daily, 5 refills  losartan 100 mg oral tablet, 100 mg= 1 tab(s), Oral, Daily, 3 refills  Metoprolol Tartrate 50 mg oral tablet, 50 mg= 1 tab(s), Oral, BID, 3 refills  Misc Prescription, See Instructions, 3 refills  Misc Prescription, See Instructions, 3 refills  MoviPrep oral powder for reconstitution, 240 mL, Oral, Daily  Natures Bounty Hair Skin & Nails, Chewed, Daily  Norvasc 10 mg oral tablet, 10 mg= 1 tab(s), Oral, Daily, 3 refills  NovoLOG FlexPen 100 units/mL injectable solution, See Instructions, 6 refills  Pantoprazole 40 mg ORAL EC-Tablet, 40 mg= 1 tab(s), Oral, BID, 3 refills  Pen needles for insulin injection, See Instructions, 11 refills  pravastatin 80 mg oral tablet, 80 mg= 1 tab(s), Oral, Daily, 3 refills  SURE COMFORT PEN NEEDLES3 32GX5/3MIS, See Instructions, 3 refills  traZODone 100 mg oral tablet, 100 mg= 1 tab(s), Oral, Once a day (at bedtime), 3 refills  Velphoro 2500 mg (500 mg elemental iron) oral tablet, chewable, 500 mg= 1 tab(s), Chewed  Zyrtec 10 mg oral tablet, 10 mg= 1 tab(s), Oral, Daily  Allergies  Macrobid?(Hives)  ibuprofen?(rash/hives)  Social History  Abuse/Neglect  No, No, Yes, 02/09/2022  Alcohol - Denies Alcohol Use, 04/08/2015  Never, 03/12/2021  Employment/School  Disabled, 09/04/2020  Exercise  Exercise duration: 30. Exercise frequency: 1-2 times/week. Exercise type: Walking., 03/06/2020  Exercise frequency: Daily. Exercise  type: Walking, Aerobics, Running, RIDE BIKE ., 08/19/2015  Financial/Legal Situation  None, 10/05/2020  Home/Environment  Lives with Alone, Children. Living situation: Home/Independent. Home equipment: Glucose monitoring, B/P CUFF . Alcohol abuse in household: No. Substance abuse in household: No. Smoker in household: No. Feels unsafe at home: No. Safe place to go: Yes. Family/Friends available for support: Yes. Concern for family members at home: No. Major illness in household: No., 08/19/2015    Never in , 10/05/2020  Nutrition/Health  Regular, Wants to lose weight: Yes. Sleeping concerns: No. Feels highly stressed: No., 10/18/2018  Sexual  Sexually active: Yes. Number of current partners 1. Gender Identity Identifies as female., 10/21/2019  Sexually active: Yes. Sexually active at age 17 Years. Number of current partners 1. Number of lifetime partners 3. Sexual orientation: Heterosexual. Uses condoms: No. History of sexual abuse: No., 09/22/2017  Spiritual/Cultural  Anglican, 03/06/2020  Substance Use - Denies Substance Abuse, 04/08/2015  Never, 07/26/2016  Tobacco - Denies Tobacco Use, 04/08/2015  Never (less than 100 in lifetime), No, 02/09/2022  Family History  Hypertension.: Father.  Immunizations  Vaccine Date Status Comments   COVID-19 mRNA, LNP-S, PF - Moderna 11/09/2021 Recorded    COVID-19 mRNA, LNP-S, PF - Moderna 02/02/2021 Recorded 2021-11-19: GIVEN BY LAUREN WYATT RN   COVID-19 mRNA, LNP-S, PF - Moderna 01/07/2021 Recorded 2021-11-19: ADMIN BY LAUREN WYATT RN AT 0840   influenza virus vaccine, inactivated 09/15/2020 Recorded [facility]  [10/6/2020] Immunization scanned into chart   influenza virus vaccine, inactivated 04/03/2019 Recorded 2019-10-23: VIS DATE: 08/07/2015   hepatitis A adult vaccine 04/03/2019 Recorded 2019-10-23: VIS DATE: 07/20/2016   tetanus/diphtheria/pertussis, acel(Tdap) 04/03/2019 Recorded 2019-08-23: VIS DATE: 02/24/2015   influenza virus  vaccine, inactivated 12/18/2015 Given    poliovirus vaccine, live, trivalent 10/24/1985 Recorded    poliovirus vaccine, live, trivalent 10/28/1981 Recorded    measles/mumps/rubella virus vaccine 10/28/1981 Recorded    poliovirus vaccine, live, trivalent 04/08/1981 Recorded    poliovirus vaccine, live, trivalent 1980 Recorded    poliovirus vaccine, live, trivalent 1980 Recorded

## 2022-05-26 DIAGNOSIS — Z76.82 ORGAN TRANSPLANT CANDIDATE: Primary | ICD-10-CM

## 2022-06-01 ENCOUNTER — TELEPHONE (OUTPATIENT)
Dept: TRANSPLANT | Facility: CLINIC | Age: 42
End: 2022-06-01
Payer: MEDICARE

## 2022-07-12 ENCOUNTER — TELEPHONE (OUTPATIENT)
Dept: TRANSPLANT | Facility: CLINIC | Age: 42
End: 2022-07-12
Payer: MEDICARE

## 2022-07-28 DIAGNOSIS — Z12.31 ENCOUNTER FOR SCREENING MAMMOGRAM FOR BREAST CANCER: Primary | ICD-10-CM

## 2022-08-03 ENCOUNTER — HOSPITAL ENCOUNTER (OUTPATIENT)
Dept: RADIOLOGY | Facility: HOSPITAL | Age: 42
Discharge: HOME | End: 2022-08-03
Attending: NURSE PRACTITIONER
Payer: MEDICARE

## 2022-08-03 DIAGNOSIS — Z12.31 ENCOUNTER FOR SCREENING MAMMOGRAM FOR BREAST CANCER: ICD-10-CM

## 2022-08-03 PROCEDURE — 77063 MAMMO DIGITAL SCREENING BILAT WITH TOMO: ICD-10-PCS | Mod: 26,TXP,, | Performed by: STUDENT IN AN ORGANIZED HEALTH CARE EDUCATION/TRAINING PROGRAM

## 2022-08-03 PROCEDURE — 77067 MAMMO DIGITAL SCREENING BILAT WITH TOMO: ICD-10-PCS | Mod: 26,TXP,, | Performed by: STUDENT IN AN ORGANIZED HEALTH CARE EDUCATION/TRAINING PROGRAM

## 2022-08-03 PROCEDURE — 77063 BREAST TOMOSYNTHESIS BI: CPT | Mod: 26,TXP,, | Performed by: STUDENT IN AN ORGANIZED HEALTH CARE EDUCATION/TRAINING PROGRAM

## 2022-08-03 PROCEDURE — 77067 SCR MAMMO BI INCL CAD: CPT | Mod: TC,TXP

## 2022-08-03 PROCEDURE — 77067 SCR MAMMO BI INCL CAD: CPT | Mod: 26,TXP,, | Performed by: STUDENT IN AN ORGANIZED HEALTH CARE EDUCATION/TRAINING PROGRAM

## 2022-09-13 ENCOUNTER — TELEPHONE (OUTPATIENT)
Dept: ENDOCRINOLOGY | Facility: CLINIC | Age: 42
End: 2022-09-13
Payer: MEDICARE

## 2022-09-13 DIAGNOSIS — E11.21 TYPE 2 DIABETES MELLITUS WITH DIABETIC NEPHROPATHY, UNSPECIFIED WHETHER LONG TERM INSULIN USE: Primary | ICD-10-CM

## 2022-09-13 RX ORDER — LANCETS
EACH MISCELLANEOUS
Qty: 150 EACH | Refills: 11 | Status: SHIPPED | OUTPATIENT
Start: 2022-09-13

## 2022-09-13 RX ORDER — INSULIN PUMP SYRINGE, 3 ML
EACH MISCELLANEOUS
Qty: 1 EACH | Refills: 0 | Status: SHIPPED | OUTPATIENT
Start: 2022-09-13

## 2022-09-13 NOTE — TELEPHONE ENCOUNTER
----- Message from Sachin Loyd sent at 9/13/2022 11:57 AM CDT -----  Regarding: Patient Called  #Betsy#    Patient is needing a new Gluecose monitor routed to [facility]. [phone]

## 2022-09-26 ENCOUNTER — TELEPHONE (OUTPATIENT)
Dept: TRANSPLANT | Facility: CLINIC | Age: 42
End: 2022-09-26
Payer: MEDICARE

## 2022-10-04 ENCOUNTER — TELEPHONE (OUTPATIENT)
Dept: TRANSPLANT | Facility: CLINIC | Age: 42
End: 2022-10-04
Payer: MEDICARE

## 2022-10-04 NOTE — TELEPHONE ENCOUNTER
Returned pt's call, no ans, LM.    ----- Message from Griffin Forte sent at 10/4/2022  9:41 AM CDT -----  Regarding: call back  Pt call to speak with Josey    Call

## 2022-10-13 ENCOUNTER — TELEPHONE (OUTPATIENT)
Dept: TRANSPLANT | Facility: CLINIC | Age: 42
End: 2022-10-13
Payer: MEDICARE

## 2022-10-13 DIAGNOSIS — Z76.82 ORGAN TRANSPLANT CANDIDATE: Primary | ICD-10-CM

## 2022-12-01 ENCOUNTER — DOCUMENTATION ONLY (OUTPATIENT)
Dept: ADMINISTRATIVE | Facility: HOSPITAL | Age: 42
End: 2022-12-01
Payer: MEDICARE

## 2022-12-22 DIAGNOSIS — Z76.82 ORGAN TRANSPLANT CANDIDATE: Primary | ICD-10-CM

## 2022-12-28 ENCOUNTER — TELEPHONE (OUTPATIENT)
Dept: TRANSPLANT | Facility: CLINIC | Age: 42
End: 2022-12-28
Payer: MEDICARE

## 2022-12-28 NOTE — TELEPHONE ENCOUNTER
MA notes per faxed adherence check form.     FOR THE PAST THREE MONTHS:    0-AMA's  0-No-shows    No concerns with care giving, transportation, or mental health    Compliance form has been scanned in 09/22.    Judith Villasenor  Abdominal Transplant MA

## 2022-12-30 ENCOUNTER — HOSPITAL ENCOUNTER (OUTPATIENT)
Dept: CARDIOLOGY | Facility: HOSPITAL | Age: 42
Discharge: HOME | End: 2022-12-30
Attending: NURSE PRACTITIONER
Payer: MEDICARE

## 2022-12-30 ENCOUNTER — OFFICE VISIT (OUTPATIENT)
Dept: TRANSPLANT | Facility: CLINIC | Age: 42
End: 2022-12-30
Payer: MEDICARE

## 2022-12-30 ENCOUNTER — HOSPITAL ENCOUNTER (OUTPATIENT)
Dept: RADIOLOGY | Facility: HOSPITAL | Age: 42
Discharge: HOME | End: 2022-12-30
Attending: NURSE PRACTITIONER
Payer: MEDICARE

## 2022-12-30 VITALS
HEART RATE: 98 BPM | OXYGEN SATURATION: 99 % | DIASTOLIC BLOOD PRESSURE: 78 MMHG | HEIGHT: 64 IN | BODY MASS INDEX: 33.38 KG/M2 | WEIGHT: 195.56 LBS | TEMPERATURE: 97 F | RESPIRATION RATE: 16 BRPM | SYSTOLIC BLOOD PRESSURE: 179 MMHG

## 2022-12-30 VITALS
HEIGHT: 65 IN | DIASTOLIC BLOOD PRESSURE: 76 MMHG | SYSTOLIC BLOOD PRESSURE: 128 MMHG | HEART RATE: 94 BPM | WEIGHT: 209 LBS | BODY MASS INDEX: 34.82 KG/M2

## 2022-12-30 DIAGNOSIS — Z76.82 ORGAN TRANSPLANT CANDIDATE: ICD-10-CM

## 2022-12-30 DIAGNOSIS — Z79.4 TYPE 2 DIABETES MELLITUS WITH CHRONIC KIDNEY DISEASE ON CHRONIC DIALYSIS, WITH LONG-TERM CURRENT USE OF INSULIN: ICD-10-CM

## 2022-12-30 DIAGNOSIS — Z99.2 TYPE 2 DIABETES MELLITUS WITH CHRONIC KIDNEY DISEASE ON CHRONIC DIALYSIS, WITH LONG-TERM CURRENT USE OF INSULIN: ICD-10-CM

## 2022-12-30 DIAGNOSIS — I27.20 PULMONARY HYPERTENSION: ICD-10-CM

## 2022-12-30 DIAGNOSIS — N18.6 TYPE 2 DIABETES MELLITUS WITH CHRONIC KIDNEY DISEASE ON CHRONIC DIALYSIS, WITH LONG-TERM CURRENT USE OF INSULIN: ICD-10-CM

## 2022-12-30 DIAGNOSIS — N18.6 ESRD (END STAGE RENAL DISEASE): ICD-10-CM

## 2022-12-30 DIAGNOSIS — E11.22 TYPE 2 DIABETES MELLITUS WITH CHRONIC KIDNEY DISEASE ON CHRONIC DIALYSIS, WITH LONG-TERM CURRENT USE OF INSULIN: ICD-10-CM

## 2022-12-30 DIAGNOSIS — Z76.82 PATIENT ON WAITING LIST FOR KIDNEY TRANSPLANT: Primary | Chronic | ICD-10-CM

## 2022-12-30 LAB
ASCENDING AORTA: 2.73 CM
AV INDEX (PROSTH): 0.7
AV MEAN GRADIENT: 8 MMHG
AV PEAK GRADIENT: 13 MMHG
AV VALVE AREA: 1.71 CM2
AV VELOCITY RATIO: 0.73
BSA FOR ECHO PROCEDURE: 2.08 M2
CV ECHO LV RWT: 0.47 CM
DOP CALC AO PEAK VEL: 1.81 M/S
DOP CALC AO VTI: 38.41 CM
DOP CALC LVOT AREA: 2.5 CM2
DOP CALC LVOT DIAMETER: 1.77 CM
DOP CALC LVOT PEAK VEL: 1.32 M/S
DOP CALC LVOT STROKE VOLUME: 65.84 CM3
DOP CALCLVOT PEAK VEL VTI: 26.77 CM
E WAVE DECELERATION TIME: 150.99 MSEC
E/A RATIO: 0.96
E/E' RATIO: 9.9 M/S
ECHO LV POSTERIOR WALL: 0.98 CM (ref 0.6–1.1)
EJECTION FRACTION: 63 %
FRACTIONAL SHORTENING: 34 % (ref 28–44)
INTERVENTRICULAR SEPTUM: 0.9 CM (ref 0.6–1.1)
IVRT: 79.92 MSEC
LA MAJOR: 4.91 CM
LA MINOR: 4.9 CM
LA WIDTH: 4.64 CM
LEFT ATRIUM SIZE: 4.17 CM
LEFT ATRIUM VOLUME INDEX MOD: 22.7 ML/M2
LEFT ATRIUM VOLUME INDEX: 39.9 ML/M2
LEFT ATRIUM VOLUME MOD: 45.83 CM3
LEFT ATRIUM VOLUME: 80.67 CM3
LEFT INTERNAL DIMENSION IN SYSTOLE: 2.74 CM (ref 2.1–4)
LEFT VENTRICLE DIASTOLIC VOLUME INDEX: 37.48 ML/M2
LEFT VENTRICLE DIASTOLIC VOLUME: 75.7 ML
LEFT VENTRICLE MASS INDEX: 61 G/M2
LEFT VENTRICLE SYSTOLIC VOLUME INDEX: 13.9 ML/M2
LEFT VENTRICLE SYSTOLIC VOLUME: 28.06 ML
LEFT VENTRICULAR INTERNAL DIMENSION IN DIASTOLE: 4.13 CM (ref 3.5–6)
LEFT VENTRICULAR MASS: 122.6 G
LV LATERAL E/E' RATIO: 8.67 M/S
LV SEPTAL E/E' RATIO: 11.56 M/S
MV A" WAVE DURATION": 11.13 MSEC
MV PEAK A VEL: 1.08 M/S
MV PEAK E VEL: 1.04 M/S
MV STENOSIS PRESSURE HALF TIME: 43.79 MS
MV VALVE AREA P 1/2 METHOD: 5.02 CM2
PISA TR MAX VEL: 3.17 M/S
PULM VEIN S/D RATIO: 1.85
PV PEAK D VEL: 0.46 M/S
PV PEAK S VEL: 0.85 M/S
RA MAJOR: 4.05 CM
RA PRESSURE: 3 MMHG
RA WIDTH: 3.03 CM
RIGHT VENTRICULAR END-DIASTOLIC DIMENSION: 2.9 CM
RV TISSUE DOPPLER FREE WALL SYSTOLIC VELOCITY 1 (APICAL 4 CHAMBER VIEW): 18.83 CM/S
SINUS: 2.52 CM
STJ: 2.64 CM
TDI LATERAL: 0.12 M/S
TDI SEPTAL: 0.09 M/S
TDI: 0.11 M/S
TR MAX PG: 40 MMHG
TRICUSPID ANNULAR PLANE SYSTOLIC EXCURSION: 2.58 CM
TV REST PULMONARY ARTERY PRESSURE: 43 MMHG

## 2022-12-30 PROCEDURE — 93306 TTE W/DOPPLER COMPLETE: CPT | Mod: 26,TXP,, | Performed by: INTERNAL MEDICINE

## 2022-12-30 PROCEDURE — 99215 OFFICE O/P EST HI 40 MIN: CPT | Mod: S$PBB,TXP,, | Performed by: NURSE PRACTITIONER

## 2022-12-30 PROCEDURE — 76770 US EXAM ABDO BACK WALL COMP: CPT | Mod: 26,TXP,, | Performed by: RADIOLOGY

## 2022-12-30 PROCEDURE — 99214 OFFICE O/P EST MOD 30 MIN: CPT | Mod: PBBFAC,25,TXP | Performed by: NURSE PRACTITIONER

## 2022-12-30 PROCEDURE — 76770 US EXAM ABDO BACK WALL COMP: CPT | Mod: TC,TXP

## 2022-12-30 PROCEDURE — 93306 ECHO (CUPID ONLY): ICD-10-PCS | Mod: 26,TXP,, | Performed by: INTERNAL MEDICINE

## 2022-12-30 PROCEDURE — 99215 PR OFFICE/OUTPT VISIT, EST, LEVL V, 40-54 MIN: ICD-10-PCS | Mod: S$PBB,TXP,, | Performed by: NURSE PRACTITIONER

## 2022-12-30 PROCEDURE — 93306 TTE W/DOPPLER COMPLETE: CPT | Mod: TXP

## 2022-12-30 PROCEDURE — 99999 PR PBB SHADOW E&M-EST. PATIENT-LVL IV: ICD-10-PCS | Mod: PBBFAC,TXP,, | Performed by: NURSE PRACTITIONER

## 2022-12-30 PROCEDURE — 99999 PR PBB SHADOW E&M-EST. PATIENT-LVL IV: CPT | Mod: PBBFAC,TXP,, | Performed by: NURSE PRACTITIONER

## 2022-12-30 PROCEDURE — 76770 US RETROPERITONEAL COMPLETE: ICD-10-PCS | Mod: 26,TXP,, | Performed by: RADIOLOGY

## 2022-12-30 RX ORDER — FLUTICASONE PROPIONATE 50 MCG
SPRAY, SUSPENSION (ML) NASAL DAILY PRN
COMMUNITY
Start: 2022-12-02

## 2022-12-30 RX ORDER — HYDROCODONE BITARTRATE AND ACETAMINOPHEN 7.5; 325 MG/1; MG/1
1 TABLET ORAL 2 TIMES DAILY PRN
COMMUNITY
Start: 2022-12-02

## 2022-12-30 NOTE — LETTER
January 3, 2023        Kirit Masterson  [address]  [place] [address]  Phone: [phone]  Fax: [phone]             [facility]- Transplant 1st Fl  [address]  [facility] [address]  Phone: [phone]   Patient: Korey Mcgrath   MR Number: [id number]   YOB: 1980   Date of Visit: 12/30/2022       Dear Dr. Kirit Masterson    Thank you for referring Korey Mcgrath to me for evaluation. Attached you will find relevant portions of my assessment and plan of care.    If you have questions, please do not hesitate to call me. I look forward to following Korey Mcgrath along with you.    Sincerely,    Sarah Liang, NP    Enclosure    If you would like to receive this communication electronically, please contact [email or web address] or [phone] to request [employer] access.    [employer] is a tool which provides read-only access to select patient information with whom you have a relationship. Its easy to use and provides real time access to review your patients record including encounter summaries, notes, results, and demographic information.    If you feel you have received this communication in error or would no longer like to receive these types of communications, please e-mail [email or web address]

## 2022-12-30 NOTE — PROGRESS NOTES
Kidney Transplant Recipient Reevalulation    Referring Physician: Kirit Masterson  Current Nephrologist: Kirit Masterson  Waitlist Status: active  Dialysis Start Date: 11/1/2018    Subjective:     CC:  Annual reassessment of kidney transplant candidacy.    HPI:  Ms. Mcgrath is a 42 y.o. year old Black or  female with ESRD secondary to diabetic nephropathy.  She has been on the wait list for a kidney transplant at [facility] since 11/1/2018. Patient is currently on hemodialysis started on 11/1/2018. Patient is dialyzing on TTS schedule.  Patient reports that she is tolerating dialysis well.. She has a RUE AV graft and LUE AV fistula. Running 4 hours per session, no issues with hypotension during treatments. Patient denies any recent hospitalizations or ED visits.    Functional Status: Does not work but stays busy around the house. Does all of her housework including cooking, cleaning, and laundry. Does yard work and cuts her grass. Able to climb flight of stairs (2 story at home) without CP, SOB, or leg cramping.  Does not appear frail. Also cares for elderly parent    Denies chest pain, SOB, leg pain, abdominal pain or LUTs.    Hospitalizations/ED visits:  none    Interval History:   Has actively working of weight loss since she trying to be a candidate for possible K/P    CXR: 7/1/20 unremarkable  PXR: 7/2/2021 per surgeon test result is favorable for transplant  Renal US: 12/30/22 Stable bilateral renal cysts   Iliacs : 4/3/2019 per surgeon test result is favorable for transplant  Echo:  Results for orders placed during the hospital encounter of 12/30/22    Echo    Interpretation Summary  · The left ventricle is normal in size with concentric remodeling and normal systolic function.  · The estimated ejection fraction is 63%.  · Indeterminate left ventricular diastolic function.  · Mild left atrial enlargement.  · Normal right ventricular size with normal right ventricular systolic function.  · Mild  mitral regurgitation.  · Mild tricuspid regurgitation.  · Normal central venous pressure (3 mmHg).  · The estimated PA systolic pressure is 43 mmHg.  · There is mild pulmonary hypertension.      Stress:  Results for orders placed during the hospital encounter of 21    Lexiscan Card Interp    Interpretation Summary    Equivocal myocardial perfusion scan.    There is a mild intensity, small sized, equivocal defect that is reversible in the anterior wall(s). This finding is equivocal due to a breast shadow overlying the myocardium.    The visually estimated ejection fraction is normal at rest and normal during stress.    There is normal wall motion at rest and post stress.    LV cavity size is normal at rest and normal at stress.    The EKG portion of this study is negative for ischemia.    The patient reported no chest pain during the stress test.    There were no arrhythmias during stress.    There are no prior studies for comparison.      Colonoscopy: age 45 due  PTH:  Lab Results   Component Value Date    .0 (H) 2021    CALCIUM 10.0 05/10/2021    PHOS 2.6 (L) 2019     PAP:  MM/3/22 no evidence of malignancy  GYN follow-up:      Current Outpatient Medications:     amLODIPine (NORVASC) 10 MG tablet, Take 10 mg by mouth once daily., Disp: , Rfl:     BIOTIN ORAL, Take 1 tablet by mouth once daily., Disp: , Rfl:     blood-glucose meter (FREESTYLE SYSTEM KIT) kit, Use as instructed, Disp: 1 each, Rfl: 0    dextroamphetamine-amphetamine 10 mg Tab, Take 20 mg by mouth Daily., Disp: , Rfl:     fluticasone propionate (FLONASE) 50 mcg/actuation nasal spray, by Each Nostril route daily as needed., Disp: , Rfl:     HYDROcodone-acetaminophen (NORCO) 7.5-325 mg per tablet, Take 1 tablet by mouth 2 (two) times daily as needed., Disp: , Rfl:     insulin aspart U-100 (NOVOLOG) 100 unit/mL injection, Inject 12 Units into the skin 2 (two) times daily., Disp: , Rfl:     insulin glargine (LANTUS) 100 unit/mL  injection, Inject 10 Units into the skin every evening., Disp: , Rfl:     lactulose (CHRONULAC) 10 gram/15 mL solution, Take 15 mLs by mouth 2 (two) times daily as needed., Disp: , Rfl:     lancets (ONETOUCH ULTRASOFT LANCETS) Misc, Lancets and strips to check CBGS 4 times per day and with any symptoms of hypoglycemia, Disp: 150 each, Rfl: 11    levocetirizine (XYZAL) 5 MG tablet, Take 5 mg by mouth once daily., Disp: , Rfl:     linaCLOtide (LINZESS) 145 mcg Cap capsule, Take 145 mcg by mouth before breakfast., Disp: , Rfl:     methoxy peg-epoetin beta (MIRCERA INJ), 50 mcg., Disp: , Rfl:     mupirocin (BACTROBAN) 2 % ointment, , Disp: , Rfl:     pantoprazole (PROTONIX) 40 MG tablet, Take 40 mg by mouth once daily., Disp: , Rfl:     VELPHORO 500 mg Chew, Take 3,000 mg by mouth 3 (three) times daily after meals. 1000 mg with  snacks, Disp: , Rfl:       Past Medical History:   Diagnosis Date    Anemia     Diabetes mellitus     Disorder of kidney and ureter     GERD (gastroesophageal reflux disease)     Hyperlipidemia     Hypertension     IDDM (insulin dependent diabetes mellitus)     Obesity     Preeclampsia        Past Surgical History:   Procedure Laterality Date    AV FISTULA PLACEMENT       SECTION       Review of Systems   Constitutional:  Negative for activity change, appetite change and fever.   HENT:  Negative for congestion, mouth sores and sore throat.    Eyes:  Positive for visual disturbance.        Wears glasses   Respiratory:  Negative for cough, chest tightness and shortness of breath.    Cardiovascular:  Negative for chest pain, palpitations and leg swelling.   Gastrointestinal:  Negative for abdominal distention, constipation, diarrhea and nausea.   Genitourinary:  Positive for decreased urine volume. Negative for difficulty urinating, frequency and hematuria.        Still urinating    Musculoskeletal:  Negative for arthralgias, gait problem and myalgias.   Skin:  Negative for wound.  "  Allergic/Immunologic: Negative for environmental allergies, food allergies and immunocompromised state.   Neurological:  Negative for dizziness, weakness and numbness.   Psychiatric/Behavioral:  Negative for sleep disturbance. The patient is not nervous/anxious.      Objective:   body mass index is 33.59 kg/m².  BP (!) 179/78 (BP Location: Right leg, Patient Position: Sitting, BP Method: Medium (Automatic))   Pulse 98   Temp 97.2 °F (36.2 °C) (Temporal)   Resp 16   Ht 5' 3.98" (1.625 m)   Wt 88.7 kg (195 lb 8.8 oz)   SpO2 99%   BMI 33.59 kg/m²     Physical Exam  Vitals and nursing note reviewed.   Constitutional:       Appearance: Normal appearance. She is obese.   HENT:      Head: Normocephalic.   Cardiovascular:      Rate and Rhythm: Normal rate and regular rhythm.      Heart sounds: Normal heart sounds.   Pulmonary:      Effort: Pulmonary effort is normal.      Breath sounds: Normal breath sounds.   Abdominal:      General: Bowel sounds are normal. There is no distension.      Palpations: Abdomen is soft.      Tenderness: There is no abdominal tenderness.   Musculoskeletal:         General: Normal range of motion.      Comments: LUE AV fistula + thrill   RUE AV graft + thrill   Skin:     General: Skin is warm and dry.   Neurological:      General: No focal deficit present.      Mental Status: She is alert.   Psychiatric:         Behavior: Behavior normal.       Labs:  Lab Results   Component Value Date    WBC 11.2 (H) 05/10/2021    HGB 11.4 (L) 05/10/2021    HCT 35.4 05/10/2021     (L) 05/10/2021    K 3.9 02/09/2022    CL 97 04/03/2019    CO2 22 05/10/2021    BUN 61.4 (H) 05/10/2021    CREATININE 7.76 (H) 05/10/2021    EGFRNONAA 6 (L) 05/10/2021    EGFRIFAFRICA 7 (L) 05/10/2021    GLUCOSE 216 (H) 05/10/2021    CALCIUM 10.0 05/10/2021    PHOS 2.6 (L) 04/03/2019    MG 1.7 (L) 09/06/2018    ALBUMIN 3.5 05/10/2021    AST 13 05/10/2021    ALT 8 05/10/2021    .0 (H) 07/02/2021       Lab Results "   Component Value Date    CPRA 5 10/11/2022    YC1NDYW A25,B37,A43 10/11/2022    CIABCLM WEAK A66 10/11/2022    CIIAB DP1 05/12/2022    ABCMT DP1 10/11/2022       Labs were reviewed with the patient.    Pre-transplant Workup:   Reviewed with the patient.    Assessment:     1. Patient on waiting list for kidney transplant    2. ESRD (end stage renal disease)    3. Pulmonary hypertension    4. Type 2 diabetes mellitus with chronic kidney disease on chronic dialysis, with long-term current use of insulin          Plan:   Needs CXR  Obtain- PAP- [facility] in [place], [place]    Actively losing weight for K/P vs Kidney alone. Please have patient seen in 6 months in clinic. Obtain ABD US, c-peptide, Needs Surgeon visit and Nephrology at that time.     Body mass index is 33.59 kg/m².  Discussed the need for weight loss to reduce abdominal obesity as well as BMI<40 to be an acceptable kidney transplant candidate.  She has questions regarding becoming KP candidate. Had long discussion regarding BMI<30 and significant weight loss that this would require. She seems motivated, and will let us know if she achieved BMI<30 in order to be evaluated for possible pancreas.       Transplant Candidacy:   Ms. Mcgrath is a suitable kidney transplant candidate.  Meets center eligibility for accepting HCV+ donor offer - yes.  Patient educated on HCV+ donors. Korey is willing  to accept HCV+ donor offer -  yes   Patient is a candidate for KDPI > 85 kidney donor offer - no (weight >88kg).  She remains in overall stable health, and will remain active on the transplant list.    Sarah Liang NP       Follow-up:   In addition to the tests noted in the plan, Ms. Mcgrath will continue to have reevaluation as per the standing pre-kidney transplant protocol:  Monthly blood for PRA  Annual return to clinic, except HIV positive, > 65 years of age, or pancreas transplant candidates who will be scheduled to see transplant  every 6 months while in pre-transplant phase  Annual re-testing: CXR, EKG, yearly mammograms for women over 40 and PSA for males over 40, cardiology follow-up as recommended by initial cardiology pre-transplant evaluation  Renal ultrasound every 2 years  Baseline colonoscopy after age 50 and repeated as recommended    UNOS Patient Status  Functional Status: 60% - Requires occasional assistance but is able to care for needs  Physical Capacity: No Limitations

## 2023-01-18 NOTE — PROGRESS NOTES
Transplant Psychosocial Evaluation Update:  Last psychosocial evaluation for transplant was completed on 7/2/21 by Marie    Pt presents today in company of minor son, Vernell and adult cousin. Pt and cousin  present as alert, oriented to person, place, time, purpose of visit. Pt and cousin deny concerns about going through with transplant and state motivation and sense of preparedness for transplantation, caregiver role, psychosocial risk factors, medical risk factors, financial aspects, and lifetime commitments. All concerns and education points as per transplant psychosocial evaluation process addressed (also refer to psychosocial dated 3/12/2021). Pt actively participated in today's interview, with cousin participating as caregiver. Pt asking questions appropriately and denies changes to previous psychosocial evaluation for transplantation which we reviewed line by line with pt and, per pt choice, with pts caregiver today.    Primary Caregivers and Transportation for Transplant:  1. Sukhdeep Jacobs, 19 y/o son, drives, [phone]  2. Ramandeep Moreno, 54 y/o aunt, drives [phone]  3. Clara Moreira, niece, drives [phone]  4. Carli Mcgrath, 73 y/o father, drives [phone]    Both pt and caregiver/s plan to stay locally at lodging arranged by themselves.  Pt and caregiver informed that [employer] [facility] [employer] will be unavailable beginning 2023.  - information reviewed in depth.  - information reviewed in depth. Caregivers plan to stay at hospital as appropriate for transplant and post-transplant process.    Pts Vocation: Pt is disabled.  Last day worked:  disability began 11/2018.    DME: diabetic equipment and supplies.     ADLS:  Pt states ability to independently accomplish all adls.     Household and Dependents: pt resides with her two sons and has two dependents at this time.    Income: Pt states ability to afford all monthly expenses and costs including for medications now and if transplanted  "based on income and on savings and assets.    Dialysis Adherence: Pt reports good compliance with all dialysis treatments.  Dialysis compliance found under "Media" tab->"dialysis compliance".  Compliance reported as satisfactory.     Pt and family deny any additional concerns, stating preparedness and motivation to proceed with organ transplant.     Suitability for Transplant:   Pt remains low risk for transplant at this time based on psychosocial risk factors and strengths.    Pt trino well overall with health needs and life in general, denies current or past suicidal/homicidal ideation,  has stable supports, adequate insurance, financial stability, transportation and caregiver plans in place, and is using no substances unless prescribed.     Payer/Plan Subscr  Sex Relation Sub. Ins. ID Effective Group Num   1. [place]* VINCENT ENRIQUEZ 1980 Female Self [id number]                                    [address]   2. [place] ENRIQUE[place] 1980 Female Self [id number]* 20                                    [address] [address]            "

## 2023-03-31 DIAGNOSIS — Z76.82 ORGAN TRANSPLANT CANDIDATE: Primary | ICD-10-CM

## 2023-04-10 ENCOUNTER — TELEPHONE (OUTPATIENT)
Dept: TRANSPLANT | Facility: CLINIC | Age: 43
End: 2023-04-10
Payer: MEDICARE

## 2023-06-06 ENCOUNTER — TELEPHONE (OUTPATIENT)
Dept: TRANSPLANT | Facility: CLINIC | Age: 43
End: 2023-06-06
Payer: MEDICARE

## 2023-06-06 DIAGNOSIS — Z76.82 ORGAN TRANSPLANT CANDIDATE: Primary | ICD-10-CM

## 2023-06-06 NOTE — TELEPHONE ENCOUNTER
----- Message from Ruth Schuler sent at 6/6/2023 12:31 PM CDT -----  Regarding: speak to coordinator  Dialysis social worker called requesting to speak with coordinator regarding this patient  States the patient got a call today about labs and is very upset  They are trying to see whats going on with her    Cell  (Veronica Cowan Dialysis Social Worker)

## 2023-06-06 NOTE — TELEPHONE ENCOUNTER
Spoke to SW at dialysis unit who will ask Unit Adger to let me know if HLA kits are not received within one week.

## 2023-06-14 ENCOUNTER — TELEPHONE (OUTPATIENT)
Dept: TRANSPLANT | Facility: CLINIC | Age: 43
End: 2023-06-14
Payer: MEDICARE

## 2023-06-14 NOTE — TELEPHONE ENCOUNTER
Spoke to pt confirming rescheduled stress test appts on 07/14/2023. New set of appt reminders for clinic visit were mailed on 06/14/2023 and pt is aware to bring care giver.

## 2023-07-27 ENCOUNTER — DOCUMENTATION ONLY (OUTPATIENT)
Dept: TRANSPLANT | Facility: CLINIC | Age: 43
End: 2023-07-27
Payer: MEDICARE

## 2023-07-27 NOTE — PROGRESS NOTES
Medical record, including care everywhere, reviewed for lab results which meet criteria for an eGFR Waiting Time Modification based on OPTN Policy 8.3.A  Lab testing from 5/23/17 found to meet criteria. Reviewed with nephrology and Waiting Time Modification Form was submitted to UNOS.

## 2023-08-08 ENCOUNTER — DOCUMENTATION ONLY (OUTPATIENT)
Dept: TRANSPLANT | Facility: CLINIC | Age: 43
End: 2023-08-08
Payer: MEDICARE

## 2023-08-08 NOTE — PROGRESS NOTES
Patient notified that the review of their medical record was completed and it was found that additional time may be due based on past lab results. Informed that the information was submitted to UNOS and approved.    Their waiting time for a kidney transplant previously began on 11/1/18. This has now been adjusted to 5/23/17 based on past lab values.

## 2023-08-22 ENCOUNTER — OFFICE VISIT (OUTPATIENT)
Dept: ENDOCRINOLOGY | Facility: CLINIC | Age: 43
End: 2023-08-22
Payer: MEDICARE

## 2023-08-22 DIAGNOSIS — E11.65 UNCONTROLLED TYPE 2 DIABETES MELLITUS WITH HYPERGLYCEMIA: Primary | ICD-10-CM

## 2023-08-22 DIAGNOSIS — E16.2 HYPOGLYCEMIA: ICD-10-CM

## 2023-08-22 PROCEDURE — 99214 OFFICE O/P EST MOD 30 MIN: CPT | Mod: 95,NTX,, | Performed by: NURSE PRACTITIONER

## 2023-08-22 PROCEDURE — 99214 PR OFFICE/OUTPT VISIT, EST, LEVL IV, 30-39 MIN: ICD-10-PCS | Mod: 95,NTX,, | Performed by: NURSE PRACTITIONER

## 2023-08-22 PROCEDURE — 95251 PR GLUCOSE MONITOR, 72 HOUR, PHYS INTERP: ICD-10-PCS | Mod: S$PBB,NDTC,NTX, | Performed by: NURSE PRACTITIONER

## 2023-08-22 PROCEDURE — 95251 CONT GLUC MNTR ANALYSIS I&R: CPT | Mod: S$PBB,NDTC,NTX, | Performed by: NURSE PRACTITIONER

## 2023-08-22 RX ORDER — DEXTROAMPHETAMINE SACCHARATE, AMPHETAMINE ASPARTATE MONOHYDRATE, DEXTROAMPHETAMINE SULFATE AND AMPHETAMINE SULFATE 5; 5; 5; 5 MG/1; MG/1; MG/1; MG/1
20 CAPSULE, EXTENDED RELEASE ORAL EVERY MORNING
COMMUNITY

## 2023-08-22 NOTE — PROGRESS NOTES
Subjective     Patient ID: Korey Mcgrath is a 43 y.o. female.    Chief Complaint: Diabetes    04/29/2020 Telemedicine Visit Note- Endocrine: 39 year old female referred to Endocrine clinic as new patient for uncontrolled diabetes with end-stage renal disease on dialysis insulin-dependent.  Due to COVID 19 restrictions and precautions telemedicine visit (audio and video) is being utilized today.  Patient states that she is a type I diabetic and was diagnosed at 15 years old.  She is currently end-stage renal disease on hemodialysis and goes to dialysis on Tuesday/Thursday/Saturday.  Patient's current A1c is 12.1 previous A1c 10.6 and 9.1.  Patient states she is on Lantus 20 units and NovoLog 10 units with a correction scale.  Patient unsure of correction scale she states that she has been using it so long that she just knows it in her head.  When asked if her blood sugar was at 250 she states she would give 4 extra units.  Likely patient is on a 1:25 > 150 correction scale.  Patient states that she does have hypoglycemia especially after dialysis.  She states sometimes after dialysis she gets weak and her blood sugar is 60.  Patient states that she is not aware of low blood sugar into lows in the 50s or 60s.  Patient has hypoglycemia unawareness.  Patient checks her blood sugars 4 times a day.  Patient reports that with dialysis they have been decreasing her insulin and blood sugars have increased.  Discussed with patient Dexcom, she states that both her sons are diabetic and her older son does have a Dexcom G6 and she is familiar and feels like a continuous glucose monitor would benefit her.  Discussed with patient that we will keep a CBG log x2 weeks with insulin doses in order to titrate insulin.  Once log is received instructed patient I will work with her to adjust insulins to improve her diabetes. [1]      09/04/2020 Endocrine Clinic Note: 40-year-old female scheduled for endocrine clinic follow-up today for  uncontrolled type II day diabetes with end-stage renal disease on dialysis insulin-dependent, hypertension, hyperlipidemia and vitamin D deficiency. Uncontrolled type 2 diabetes insulin-dependent due to CKD and on dialysis (Tues, Thru, Sat) current A1c 11.8.  Previous A1c 12.1 pt check CBG's 4 times a day. Her Glucometer she brought she has occasional CBG checks with hyperglycemia she states she forgot her other meter at home.  Patient reports hypoglycemic episode on her dialysis days with blood sugar sometimes in the 40s and 50s.  She states on days of dialysis a lot of time she awakens sweaty weak and clammy.  She denies any hypoglycemia on nondialysis days.  End-stage renal disease on dialysis patient is currently on [facility] transplant list for double organ. Hyperlipidemia Total 159 HDL 53 triglycerides 112 LDL 84 at goal on a statin per ADA guidelines.  Hypertension at goal.  Vitamin D deficiency Vitamin D level 39.9 on 4/29/2020. [1]      01/06/2021 Endocrine Clinic Note: 40 year old female scheduled today for uncontrolled type 2 diabetes with end-stage renal disease on dialysis insulin-dependent, hypertension, hyperlipidemia and vitamin D deficiency. Uncontrolled type 2 diabetes patient's current A1C 6.7 improved from previous A1C 12.1, 10.6 and 9.1. On patient's previous visit she was having hypoglycemia on dialysis days and insulin was reduced to 5 units w/ meals w/ correction on her dialysis days.  Patient is wearing a Dexcom average glucose is 201 and average on best day 169.  Patient at times has elevated glucose while on dialysis she states she does not eat is just certain days with dialysis.  Patient has hypoglycemic episodes mainly at noon patient states that time she gives her prandial insulin late because she is on the go or does not eat as much.  Patient also has 2 sons that are type I and she is carb counting with 1 son and we will convert her to carb counting.  Patient states having the Dexcom  has helped her improve her glucose. [1]     12/8/2021 Endocrine clinic note: 40-year-old female scheduled today for endocrine clinic follow-up.  History of type 2 diabetes with end-stage renal disease on dialysis insulin-dependent, hypertension, hyperlipidemia and vitamin D deficiency.  Type 2 diabetes current A1C 6.7 Previous A1c 6.7, 12.1, 10.6 and 9.1.  Patient has not been seen in endocrine clinic for 11 months due to missed appointments.  Patient returns today to be reestablished endocrine clinic.  Patient is wearing a Dexcom average glucose 189.  Patient has episodes of hypoglycemia with glucose being elevated prior 2 to 3 hours indicating late prandial dosing.  Patient states on one episode she was shopping with her mother and forgot her insulin and gave her insulin when she returned home.  At other times patient overcorrected.  Discussed prandial dosing with patient end-stage renal disease patient is currently on dialysis Tuesday/Thursday/Saturday and is currently on the renal transplant list in [place] and [place].      The patient location is: Dialysis   The chief complaint leading to consultation is: Type 2 Diabetes     Visit type: audiovisual    Face to Face time with patient: 15  30 minutes of total time spent on the encounter, which includes face to face time and non-face to face time preparing to see the patient (eg, review of tests), Obtaining and/or reviewing separately obtained history, Documenting clinical information in the electronic or other health record, Independently interpreting results (not separately reported) and communicating results to the patient/family/caregiver, or Care coordination (not separately reported).     Each patient to whom he or she provides medical services by telemedicine is:  (1) informed of the relationship between the physician and patient and the respective role of any other health care provider with respect to management of the patient; and (2) notified that he or  she may decline to receive medical services by telemedicine and may withdraw from such care at any time.    Endocrine Clinic Notes: 08/22/2023:  43-year-old female scheduled today as endocrine clinic follow-up.  Previously seen 12/08/2021.  No recent A1c on patient states her recent A1c was 9.0.  Patient has a Dexcom has not being worn since she states she lost her transmitter maxed disc report 07/14/2023 through 07/27/2023.  Days with CGM data 71% 10 of 14 days.  Average glucose 232.  35% very high, 39% high, 25% in range, less than 1% low, less than 1% very low.  Patient had a pattern of daytime and nighttime.  On patient's best day average glucose 160 70% in range no hypoglycemia noted.  On other days patient has prandial spikes above 400 for multiple hours with sudden drop hypoglycemia indicating patient is forgetting to take prandial insulin at times taking late leading to hypoglycemia.  Patient has not had her Dexcom due to not having transmitters asked to be free to a G7 paperwork will be sent to bring patient to the G7.    Instructed patient's samples G7 is available the office she will come by after dialysis to  samples of the G7 to start until her supplies come in.             Review of Systems   Constitutional:  Negative for activity change, appetite change and fatigue.   HENT:  Negative for dental problem, hearing loss, tinnitus, trouble swallowing and goiter.    Eyes:  Negative for photophobia, pain and visual disturbance.   Respiratory:  Negative for cough, chest tightness and wheezing.    Cardiovascular:  Negative for chest pain, palpitations and leg swelling.   Gastrointestinal:  Negative for abdominal pain, constipation, diarrhea, nausea and reflux.   Endocrine: Negative for cold intolerance, heat intolerance, polydipsia and polyphagia.   Genitourinary:  Negative for difficulty urinating, flank pain, hematuria, hot flashes, menstrual irregularity, menstrual problem, nocturia and urgency.    Musculoskeletal:  Negative for back pain, gait problem, joint swelling, leg pain and joint deformity.   Integumentary:  Negative for color change, pallor, rash and breast discharge.   Allergic/Immunologic: Negative for environmental allergies, food allergies and immunocompromised state.   Neurological:  Negative for tremors, seizures, headaches, coordination difficulties, memory loss and coordination difficulties.   Psychiatric/Behavioral:  Negative for agitation, behavioral problems and sleep disturbance. The patient is not nervous/anxious.           Objective     Physical Exam  Constitutional:       General: She is not in acute distress.     Appearance: Normal appearance. She is not ill-appearing.   HENT:      Head: Normocephalic and atraumatic.      Right Ear: External ear normal.      Left Ear: External ear normal.      Nose: Nose normal. No congestion or rhinorrhea.      Mouth/Throat:      Mouth: Mucous membranes are moist.      Pharynx: Oropharynx is clear. No oropharyngeal exudate.   Eyes:      General:         Right eye: No discharge.         Left eye: No discharge.      Conjunctiva/sclera: Conjunctivae normal.      Pupils: Pupils are equal, round, and reactive to light.   Neck:      Thyroid: No thyroid mass, thyromegaly or thyroid tenderness.   Cardiovascular:      Rate and Rhythm: Normal rate and regular rhythm.      Pulses: Normal pulses.      Heart sounds: Normal heart sounds. No murmur heard.  Pulmonary:      Effort: Pulmonary effort is normal. No respiratory distress.      Breath sounds: Normal breath sounds.   Abdominal:      General: Abdomen is flat. Bowel sounds are normal. There is no distension.      Palpations: Abdomen is soft.      Tenderness: There is no abdominal tenderness.   Musculoskeletal:         General: No swelling or tenderness. Normal range of motion.      Cervical back: Normal range of motion and neck supple. No tenderness.      Right lower leg: No edema.      Left lower leg: No  edema.   Feet:      Right foot:      Skin integrity: Skin integrity normal.      Left foot:      Skin integrity: Skin integrity normal.   Lymphadenopathy:      Cervical: No cervical adenopathy.   Skin:     General: Skin is warm and dry.      Coloration: Skin is not jaundiced or pale.   Neurological:      General: No focal deficit present.      Mental Status: She is alert and oriented to person, place, and time. Mental status is at baseline.      Coordination: Coordination normal.      Gait: Gait normal.   Psychiatric:         Mood and Affect: Mood normal.         Behavior: Behavior normal.         Thought Content: Thought content normal.            Assessment and Plan     1. Uncontrolled type 2 diabetes with hyperglycemia  Recent A1C 9.0   Medication Lantus 12 units once a day, Novolog 10 units twice day changes:  Take prandial insulin with all meals  A1C goal <7.0   Follow ADA diet, avoid soda, simple sweets, and limit rice, breads and starches  Do not skip meals, eat balanced ADA approved meals   Maintain a healthy weight, exercise 4-5 times a week for 30 minutes  Diet and medication adherence discussed on visit  Call Clinic to report Hypoglycemia (CBG's <90)  Yearly Diabetic eye exam as recommended   Yearly flu shot, pneumonia shot as indicated     Component Ref Range & Units 2 yr ago  (5/10/21) 3 yr ago  (3/6/20) 3 yr ago  (10/23/19) 4 yr ago  (8/23/19) 4 yr ago  (4/3/19)   Estimated Average Glucose mg/dL 151.3  301 High  R  258 High  R  214 High  R  214 High  R    Hemoglobin A1c <<=7.0 % 6.9  12.1 High  R  10.6 High  R  9.1 High  R  9.1 High  R, CM     2. Hypoglycemia  Due to late prandial dosing  After prandial dosing low 50s asymptomatic      Follow up in about 3 months (around 11/22/2023) for type 1 diabetes, w/ Dexcom.      I spent a total of 30 minutes on the day of the visit.  This includes face to face time and non-face to face time preparing to see the patient (eg, review of tests), obtaining and/or  reviewing separately obtained history, documenting clinical information in the electronic or other health record, independently interpreting results and communicating results to the patient/family/caregiver, or care coordinator.

## 2023-08-25 ENCOUNTER — TELEPHONE (OUTPATIENT)
Dept: ADMINISTRATIVE | Facility: HOSPITAL | Age: 43
End: 2023-08-25
Payer: MEDICARE

## 2023-08-29 ENCOUNTER — EPISODE CHANGES (OUTPATIENT)
Dept: TRANSPLANT | Facility: CLINIC | Age: 43
End: 2023-08-29

## 2023-09-12 ENCOUNTER — TELEPHONE (OUTPATIENT)
Dept: ENDOCRINOLOGY | Facility: CLINIC | Age: 43
End: 2023-09-12
Payer: MEDICARE

## 2023-09-12 NOTE — TELEPHONE ENCOUNTER
----- Message from Marilin Mak sent at 9/6/2023  3:03 PM CDT -----  Patient had last office visit on 8/22/23 julita/  Betsy, provider was upgraded to G6 to G7.    Patient has been speaking with [employer], regarding obtaining new order for G7    [phone]- [email or web address]      If any more information is needed, please contact patient at [phone]    N.O.V   12/8/23 shae Meyer     Thanks

## 2023-09-12 NOTE — TELEPHONE ENCOUNTER
Spoke with patient, informed we did send the order for a Dexcom G7 to [facility]. Provided pt with song's phone number so she can follow up on her order.

## 2023-09-21 ENCOUNTER — TELEPHONE (OUTPATIENT)
Dept: TRANSPLANT | Facility: CLINIC | Age: 43
End: 2023-09-21
Payer: MEDICARE

## 2023-09-22 ENCOUNTER — TELEPHONE (OUTPATIENT)
Dept: TRANSPLANT | Facility: CLINIC | Age: 43
End: 2023-09-22
Payer: MEDICARE

## 2023-09-22 DIAGNOSIS — E11.9 TYPE 2 DIABETES MELLITUS WITHOUT COMPLICATION, UNSPECIFIED WHETHER LONG TERM INSULIN USE: Primary | ICD-10-CM

## 2023-09-22 DIAGNOSIS — Z12.31 ENCOUNTER FOR SCREENING MAMMOGRAM FOR MALIGNANT NEOPLASM OF BREAST: ICD-10-CM

## 2023-09-22 DIAGNOSIS — Z76.82 ORGAN TRANSPLANT CANDIDATE: ICD-10-CM

## 2023-09-22 NOTE — TELEPHONE ENCOUNTER
ON-CALL NOTE    UNOS# [id number]    Notified by Josey Pitts, , that Korey Mcgrath is eligible for kidney offer.  Spoke with patient and identified no acute medical issues with telephone assessment. Protocol script read to patient regarding N/A, standard donor offer. Patient verbalized understanding, all questions answered, patient declines organ offer. Notified by Josey Pitts that virtual crossmatch is  weak positive .  Will need to provide fresh sample for prospective crossmatch. Patient states that she is attempting to lose weight in order to qualify listing for kidney and pancreas transplant and prefers to wait until that time to accept transplant offers. Will update listed coordinator, Georgiana.  Patient was asked if they have had a positive COVID-19 test or if they have any signs or symptoms. Informed patient that they will be tested for COVID-19 upon arrival to the hospital, unless have a previous positive result. If tested and result is positive, the transplant will not be able to occur, they will be inactivated on the wait list for 21 days per protocol and required to quarantine.

## 2023-09-22 NOTE — LETTER
September 22, 2023    Korey Mcgrath  [address]  [place] [address]    Dear Korey Mcgrath:  MRN: [id number]    The [facility] team reviewed your transplant candidacy.  It is our programs opinion that you are temporarily not a transplant candidate at our facility as of 9/22/2023 because of patient choice.  You will remain listed on the wait list, but will not be able to receive a transplant until this issue is resolved.      Attached is a letter from the [employer] (UNOS).  It describes the services and information offered to patients by UNOS and the Organ Procurement and Transplant Network.    The [facility] team remains available to answer any questions.  Should you have any questions regarding this decision, please do not hesitate to contact our pre-transplant office.      Sincerely,        Yvonne Whitlock MD  Medical Director, Kidney & Kidney/Pancreas Transplantation  lh/enclosure    Cc: Dr. Kirit Masterson        [facility]               The Organ Procurement and Transplantation Network   Toll-free patient services line: [phone]  Your resource for organ transplant information      Staffed 8:30 am - 5:00 pm ET Monday - Friday   Leave a message 24/7 to receive a call back    The Organ Procurement and Transplantation Network (OPTN) is the national transplant system. It makes the policies that decide how donated organs are matched to patients waiting for a transplant. The OPTN:    Makes sure donated organs get matched to people on the transplant waiting list  Tells people about the donation and transplant processes  Makes sure that the public knows about the need for more organ and tissue donations    The OPTN has a free patient services line that you can call to:  Get more information about:   o Organ donation and organ transplants   o Donation and transplant policies  Get an information kit with:   o A list of transplant  hospitals   o Waiting list information  Talk about any questions you may have about your transplant hospital or organ procurement organization. The staff will do their best to help you or point you to others who may help.  Find out how you can volunteer with the OPTN and help shape transplant policy    The patient services line number is: 2-442-742-1742    Patient services line staff CANNOT answer questions about your own medical care, including:  Waiting list status  Test results  Medical records  You will need to call your transplant hospital for this information.    The following websites have more information about transplantation and donation:  OPTN: https://optn.transplant.hrsa.gov/  For potential living donors and transplant recipients:   o Living with transplant: https://www.transplantliving.org/   o Living donation process: https://optn.transplant.hrsa.gov/living-donation/     o Financial assistance: https://www.livingdonorassistance.org/  Transplantation data: https://www.srtr.org/  Organ donation: https://www.organdonor.gov/    Volunteer with the OPTN: https://optn.transplant.hrsa.gov/get-involved

## 2023-09-22 NOTE — TELEPHONE ENCOUNTER
----- Message from Becky Loyd sent at 9/22/2023  9:20 AM CDT -----  Regarding: Consult/Advisory  Contact: Korey Mcgrath  Consult/Advisory    Name Of Caller: Korey Mcgrath       Contact Preference: [phone] (home)      Nature of call: Patient calling to speak to coordinator. No other details provided when asked. Requesting a call back.

## 2023-09-22 NOTE — TELEPHONE ENCOUNTER
Explained the criteria for KP, patient chooses to wait for a KP and elected to go inactive on the kidney only list at this time.

## 2023-09-25 ENCOUNTER — TELEPHONE (OUTPATIENT)
Dept: TRANSPLANT | Facility: CLINIC | Age: 43
End: 2023-09-25
Payer: MEDICARE

## 2023-09-25 NOTE — TELEPHONE ENCOUNTER
Spoke to pt confirming appts on 11/10/2023 ([facility]) and 12/15/2023 ([facility]). Appt reminders were mailed on 09/25/2023 and pt is aware to bring care giver.

## 2023-11-28 ENCOUNTER — TELEPHONE (OUTPATIENT)
Dept: ENDOCRINOLOGY | Facility: CLINIC | Age: 43
End: 2023-11-28
Payer: MEDICARE

## 2023-11-28 DIAGNOSIS — E11.65 UNCONTROLLED TYPE 2 DIABETES MELLITUS WITH HYPERGLYCEMIA, WITHOUT LONG-TERM CURRENT USE OF INSULIN: Primary | ICD-10-CM

## 2023-11-28 RX ORDER — PEN NEEDLE, DIABETIC 30 GX3/16"
NEEDLE, DISPOSABLE MISCELLANEOUS
Qty: 150 EACH | Refills: 11 | Status: SHIPPED | OUTPATIENT
Start: 2023-11-28

## 2023-11-28 RX ORDER — INSULIN GLARGINE 100 [IU]/ML
20 INJECTION, SOLUTION SUBCUTANEOUS DAILY
Qty: 15 ML | Refills: 11 | Status: SHIPPED | OUTPATIENT
Start: 2023-11-28

## 2023-11-28 RX ORDER — INSULIN ASPART 100 [IU]/ML
12 INJECTION, SOLUTION INTRAVENOUS; SUBCUTANEOUS 2 TIMES DAILY
Status: CANCELLED | OUTPATIENT
Start: 2023-11-28

## 2023-11-28 RX ORDER — INSULIN ASPART 100 [IU]/ML
12 INJECTION, SOLUTION INTRAVENOUS; SUBCUTANEOUS
Qty: 15 ML | Refills: 11 | Status: SHIPPED | OUTPATIENT
Start: 2023-11-28

## 2023-11-28 RX ORDER — INSULIN ASPART 100 [IU]/ML
12 INJECTION, SOLUTION INTRAVENOUS; SUBCUTANEOUS 2 TIMES DAILY
Qty: 10 ML | Refills: 3 | Status: CANCELLED | OUTPATIENT
Start: 2023-11-28

## 2023-11-28 NOTE — TELEPHONE ENCOUNTER
Last visit with Betsy White NP: 8/22/2023  Last visit in [facility]: 8/22/2023    Patient's next visit in [facility] END[facility]: 2/23/2024

## 2023-11-28 NOTE — TELEPHONE ENCOUNTER
----- Message from Marilin Mak sent at 11/28/2023  8:20 AM CST -----  Patient cynthia Meyer     Patient's appointment for 12/8/23 has been rescheduled due to provider being out     New appointment 02/23/23 and placed on waiting list     Patient needs refills on Novolog and pens, pharmacy still the same as listed on chart    Thanks

## 2023-11-29 ENCOUNTER — HOSPITAL ENCOUNTER (OUTPATIENT)
Dept: RADIOLOGY | Facility: HOSPITAL | Age: 43
Discharge: HOME | End: 2023-11-29
Attending: NURSE PRACTITIONER
Payer: MEDICARE

## 2023-11-29 DIAGNOSIS — Z76.82 ORGAN TRANSPLANT CANDIDATE: ICD-10-CM

## 2023-11-29 DIAGNOSIS — Z12.31 ENCOUNTER FOR SCREENING MAMMOGRAM FOR MALIGNANT NEOPLASM OF BREAST: ICD-10-CM

## 2023-11-29 PROCEDURE — 93978 VASCULAR STUDY: CPT | Mod: TC,TXP

## 2023-11-29 PROCEDURE — 77067 SCR MAMMO BI INCL CAD: CPT | Mod: 26,TXP,, | Performed by: RADIOLOGY

## 2023-11-29 PROCEDURE — 77063 BREAST TOMOSYNTHESIS BI: CPT | Mod: 26,TXP,, | Performed by: RADIOLOGY

## 2023-11-29 PROCEDURE — 72170 X-RAY EXAM OF PELVIS: CPT | Mod: TC,TXP

## 2023-11-29 PROCEDURE — 77067 SCR MAMMO BI INCL CAD: CPT | Mod: TC,TXP

## 2023-11-29 PROCEDURE — 71046 X-RAY EXAM CHEST 2 VIEWS: CPT | Mod: TC,TXP

## 2023-11-29 PROCEDURE — 77063 MAMMO DIGITAL SCREENING BILAT WITH TOMO: ICD-10-PCS | Mod: 26,TXP,, | Performed by: RADIOLOGY

## 2023-11-29 PROCEDURE — 77067 MAMMO DIGITAL SCREENING BILAT WITH TOMO: ICD-10-PCS | Mod: 26,TXP,, | Performed by: RADIOLOGY

## 2023-12-11 ENCOUNTER — TELEPHONE (OUTPATIENT)
Dept: TRANSPLANT | Facility: CLINIC | Age: 43
End: 2023-12-11
Payer: MEDICARE

## 2023-12-13 ENCOUNTER — TELEPHONE (OUTPATIENT)
Dept: CARDIOLOGY | Facility: HOSPITAL | Age: 43
End: 2023-12-13
Payer: MEDICARE

## 2023-12-15 ENCOUNTER — HOSPITAL ENCOUNTER (OUTPATIENT)
Dept: CARDIOLOGY | Facility: HOSPITAL | Age: 43
Discharge: HOME | End: 2023-12-15
Attending: NURSE PRACTITIONER
Payer: MEDICARE

## 2023-12-15 ENCOUNTER — OFFICE VISIT (OUTPATIENT)
Dept: TRANSPLANT | Facility: CLINIC | Age: 43
End: 2023-12-15
Payer: MEDICARE

## 2023-12-15 VITALS
SYSTOLIC BLOOD PRESSURE: 140 MMHG | HEART RATE: 78 BPM | BODY MASS INDEX: 33.29 KG/M2 | HEIGHT: 64 IN | DIASTOLIC BLOOD PRESSURE: 84 MMHG | WEIGHT: 195 LBS

## 2023-12-15 VITALS
DIASTOLIC BLOOD PRESSURE: 77 MMHG | HEIGHT: 64 IN | WEIGHT: 195 LBS | SYSTOLIC BLOOD PRESSURE: 136 MMHG | HEART RATE: 94 BPM | BODY MASS INDEX: 33.29 KG/M2

## 2023-12-15 VITALS
HEART RATE: 100 BPM | TEMPERATURE: 98 F | DIASTOLIC BLOOD PRESSURE: 67 MMHG | BODY MASS INDEX: 30.12 KG/M2 | RESPIRATION RATE: 16 BRPM | HEIGHT: 65 IN | WEIGHT: 180.75 LBS | SYSTOLIC BLOOD PRESSURE: 149 MMHG | OXYGEN SATURATION: 100 %

## 2023-12-15 DIAGNOSIS — N18.6 ESRD (END STAGE RENAL DISEASE): ICD-10-CM

## 2023-12-15 DIAGNOSIS — Z99.2 TYPE 2 DIABETES MELLITUS WITH CHRONIC KIDNEY DISEASE ON CHRONIC DIALYSIS, WITH LONG-TERM CURRENT USE OF INSULIN: ICD-10-CM

## 2023-12-15 DIAGNOSIS — N18.6 TYPE 2 DIABETES MELLITUS WITH CHRONIC KIDNEY DISEASE ON CHRONIC DIALYSIS, WITH LONG-TERM CURRENT USE OF INSULIN: ICD-10-CM

## 2023-12-15 DIAGNOSIS — Z76.82 ORGAN TRANSPLANT CANDIDATE: ICD-10-CM

## 2023-12-15 DIAGNOSIS — I10 ESSENTIAL HYPERTENSION: ICD-10-CM

## 2023-12-15 DIAGNOSIS — E11.22 TYPE 2 DIABETES MELLITUS WITH CHRONIC KIDNEY DISEASE ON CHRONIC DIALYSIS, WITH LONG-TERM CURRENT USE OF INSULIN: ICD-10-CM

## 2023-12-15 DIAGNOSIS — Z76.82 PATIENT ON WAITING LIST FOR KIDNEY TRANSPLANT: Primary | Chronic | ICD-10-CM

## 2023-12-15 DIAGNOSIS — Z79.4 TYPE 2 DIABETES MELLITUS WITH CHRONIC KIDNEY DISEASE ON CHRONIC DIALYSIS, WITH LONG-TERM CURRENT USE OF INSULIN: ICD-10-CM

## 2023-12-15 DIAGNOSIS — I27.20 PULMONARY HYPERTENSION: ICD-10-CM

## 2023-12-15 LAB
ASCENDING AORTA: 2.47 CM
AV INDEX (PROSTH): 1.06
AV MEAN GRADIENT: 2 MMHG
AV PEAK GRADIENT: 3 MMHG
AV VALVE AREA BY VELOCITY RATIO: 3.02 CM²
AV VALVE AREA: 3.63 CM²
AV VELOCITY RATIO: 0.88
BSA FOR ECHO PROCEDURE: 2 M2
CV ECHO LV RWT: 0.29 CM
CV PHARM DOSE: 0.4 MG
CV STRESS BASE HR: 94 BPM
DIASTOLIC BLOOD PRESSURE: 77 MMHG
DOP CALC AO PEAK VEL: 0.93 M/S
DOP CALC AO VTI: 14.81 CM
DOP CALC LVOT AREA: 3.4 CM2
DOP CALC LVOT DIAMETER: 2.09 CM
DOP CALC LVOT PEAK VEL: 0.82 M/S
DOP CALC LVOT STROKE VOLUME: 53.73 CM3
DOP CALCLVOT PEAK VEL VTI: 15.67 CM
E WAVE DECELERATION TIME: 154.23 MSEC
E/A RATIO: 0.75
E/E' RATIO: 6.11 M/S
ECHO LV POSTERIOR WALL: 0.7 CM (ref 0.6–1.1)
EJECTION FRACTION- HIGH: 59 %
END DIASTOLIC INDEX-HIGH: 155 ML/M2
END DIASTOLIC INDEX-LOW: 91 ML/M2
END SYSTOLIC INDEX-HIGH: 78 ML/M2
END SYSTOLIC INDEX-LOW: 40 ML/M2
FRACTIONAL SHORTENING: 33 % (ref 28–44)
INTERVENTRICULAR SEPTUM: 0.61 CM (ref 0.6–1.1)
LA MAJOR: 5.24 CM
LA MINOR: 4.8 CM
LA WIDTH: 4.21 CM
LEFT ATRIUM SIZE: 3.85 CM
LEFT ATRIUM VOLUME INDEX MOD: 30.9 ML/M2
LEFT ATRIUM VOLUME INDEX: 35.8 ML/M2
LEFT ATRIUM VOLUME MOD: 59.72 CM3
LEFT ATRIUM VOLUME: 69.03 CM3
LEFT INTERNAL DIMENSION IN SYSTOLE: 3.25 CM (ref 2.1–4)
LEFT VENTRICLE DIASTOLIC VOLUME INDEX: 56.31 ML/M2
LEFT VENTRICLE DIASTOLIC VOLUME: 108.67 ML
LEFT VENTRICLE MASS INDEX: 51 G/M2
LEFT VENTRICLE SYSTOLIC VOLUME INDEX: 22.1 ML/M2
LEFT VENTRICLE SYSTOLIC VOLUME: 42.64 ML
LEFT VENTRICULAR INTERNAL DIMENSION IN DIASTOLE: 4.82 CM (ref 3.5–6)
LEFT VENTRICULAR MASS: 99.08 G
LV LATERAL E/E' RATIO: 7.25 M/S
LV SEPTAL E/E' RATIO: 5.27 M/S
MV A" WAVE DURATION": 9.42 MSEC
MV PEAK A VEL: 0.77 M/S
MV PEAK E VEL: 0.58 M/S
MV STENOSIS PRESSURE HALF TIME: 44.73 MS
MV VALVE AREA P 1/2 METHOD: 4.92 CM2
NUC REST DIASTOLIC VOLUME INDEX: 62
NUC REST EJECTION FRACTION: 66
NUC REST SYSTOLIC VOLUME INDEX: 21
NUC STRESS DIASTOLIC VOLUME INDEX: 82
NUC STRESS EJECTION FRACTION: 63 %
NUC STRESS SYSTOLIC VOLUME INDEX: 30
OHS CV CPX 1 MINUTE RECOVERY HEART RATE: 99 BPM
OHS CV CPX 85 PERCENT MAX PREDICTED HEART RATE MALE: 150
OHS CV CPX MAX PREDICTED HEART RATE: 177
OHS CV CPX PATIENT IS FEMALE: 1
OHS CV CPX PATIENT IS MALE: 0
OHS CV CPX PEAK DIASTOLIC BLOOD PRESSURE: 59 MMHG
OHS CV CPX PEAK HEAR RATE: 96 BPM
OHS CV CPX PEAK RATE PRESSURE PRODUCT: NORMAL
OHS CV CPX PEAK SYSTOLIC BLOOD PRESSURE: 116 MMHG
OHS CV CPX PERCENT MAX PREDICTED HEART RATE ACHIEVED: 57
OHS CV CPX RATE PRESSURE PRODUCT PRESENTING: NORMAL
PISA TR MAX VEL: 2.47 M/S
PULM VEIN S/D RATIO: 1.21
PV PEAK D VEL: 0.47 M/S
PV PEAK S VEL: 0.57 M/S
RA MAJOR: 4.44 CM
RA PRESSURE ESTIMATED: 3 MMHG
RA WIDTH: 3.8 CM
RETIRED EF AND QEF - SEE NOTES: 47 %
RIGHT VENTRICULAR END-DIASTOLIC DIMENSION: 3.97 CM
RV TB RVSP: 5 MMHG
SINUS: 2.87 CM
STJ: 2.1 CM
SYSTOLIC BLOOD PRESSURE: 136 MMHG
TDI LATERAL: 0.08 M/S
TDI SEPTAL: 0.11 M/S
TDI: 0.1 M/S
TR MAX PG: 24 MMHG
TRICUSPID ANNULAR PLANE SYSTOLIC EXCURSION: 3.04 CM
TV REST PULMONARY ARTERY PRESSURE: 27 MMHG
Z-SCORE OF LEFT VENTRICULAR DIMENSION IN END DIASTOLE: -1.25
Z-SCORE OF LEFT VENTRICULAR DIMENSION IN END SYSTOLE: -0.27

## 2023-12-15 PROCEDURE — 93306 TTE W/DOPPLER COMPLETE: CPT | Mod: 26,TXP,, | Performed by: INTERNAL MEDICINE

## 2023-12-15 PROCEDURE — 93017 CV STRESS TEST TRACING ONLY: CPT | Mod: TXP

## 2023-12-15 PROCEDURE — 93306 ECHO (CUPID ONLY): ICD-10-PCS | Mod: 26,TXP,, | Performed by: INTERNAL MEDICINE

## 2023-12-15 PROCEDURE — 93016 NUCLEAR STRESS - CARDIOLOGY INTERPRETED (CUPID ONLY): ICD-10-PCS | Mod: TXP,,, | Performed by: INTERNAL MEDICINE

## 2023-12-15 PROCEDURE — 78452 NUCLEAR STRESS - CARDIOLOGY INTERPRETED (CUPID ONLY): ICD-10-PCS | Mod: 26,TXP,, | Performed by: INTERNAL MEDICINE

## 2023-12-15 PROCEDURE — 99215 PR OFFICE/OUTPT VISIT, EST, LEVL V, 40-54 MIN: ICD-10-PCS | Mod: S$PBB,TXP,, | Performed by: NURSE PRACTITIONER

## 2023-12-15 PROCEDURE — 99215 OFFICE O/P EST HI 40 MIN: CPT | Mod: S$PBB,TXP,, | Performed by: NURSE PRACTITIONER

## 2023-12-15 PROCEDURE — 93306 TTE W/DOPPLER COMPLETE: CPT | Mod: TXP

## 2023-12-15 PROCEDURE — 93016 CV STRESS TEST SUPVJ ONLY: CPT | Mod: TXP,,, | Performed by: INTERNAL MEDICINE

## 2023-12-15 PROCEDURE — 93018 NUCLEAR STRESS - CARDIOLOGY INTERPRETED (CUPID ONLY): ICD-10-PCS | Mod: TXP,,, | Performed by: INTERNAL MEDICINE

## 2023-12-15 PROCEDURE — 99999 PR PBB SHADOW E&M-EST. PATIENT-LVL IV: CPT | Mod: PBBFAC,TXP,, | Performed by: NURSE PRACTITIONER

## 2023-12-15 PROCEDURE — 99999 PR PBB SHADOW E&M-EST. PATIENT-LVL IV: ICD-10-PCS | Mod: PBBFAC,TXP,, | Performed by: NURSE PRACTITIONER

## 2023-12-15 PROCEDURE — 93018 CV STRESS TEST I&R ONLY: CPT | Mod: TXP,,, | Performed by: INTERNAL MEDICINE

## 2023-12-15 PROCEDURE — 63600175 PHARM REV CODE 636 W HCPCS: Mod: TXP | Performed by: NURSE PRACTITIONER

## 2023-12-15 PROCEDURE — 78452 HT MUSCLE IMAGE SPECT MULT: CPT | Mod: 26,TXP,, | Performed by: INTERNAL MEDICINE

## 2023-12-15 PROCEDURE — 99214 OFFICE O/P EST MOD 30 MIN: CPT | Mod: PBBFAC,25,TXP | Performed by: NURSE PRACTITIONER

## 2023-12-15 RX ORDER — REGADENOSON 0.08 MG/ML
0.4 INJECTION, SOLUTION INTRAVENOUS
Status: COMPLETED | OUTPATIENT
Start: 2023-12-15 | End: 2023-12-15

## 2023-12-15 RX ADMIN — REGADENOSON 0.4 MG: 0.08 INJECTION, SOLUTION INTRAVENOUS at 09:12

## 2023-12-15 NOTE — LETTER
December 26, 2023        Kirit Masterson  [address]  [place] [address]  Phone: [phone]  Fax: [phone]             [facility]- Transplant 1st Fl  [address]  [facility] [address]  Phone: [phone]   Patient: Korey Mcgrath   MR Number: [id number]   YOB: 1980   Date of Visit: 12/15/2023       Dear Dr. Kirit Masterson    Thank you for referring Korey Mcgrath to me for evaluation. Attached you will find relevant portions of my assessment and plan of care.    If you have questions, please do not hesitate to call me. I look forward to following Korey Mcgrath along with you.    Sincerely,    Sarah Liang, NP    Enclosure    If you would like to receive this communication electronically, please contact [email or web address] or [phone] to request [employer] access.    [employer] is a tool which provides read-only access to select patient information with whom you have a relationship. Its easy to use and provides real time access to review your patients record including encounter summaries, notes, results, and demographic information.    If you feel you have received this communication in error or would no longer like to receive these types of communications, please e-mail [email or web address]

## 2023-12-15 NOTE — PROGRESS NOTES
AA YEARLY PATIENT EDUCATION NOTE    Ms. Korey Mcgrath was seen in pre-kidney transplant clinic for yearly (or six months) evaluation for kidney, kidney/pancreas or pancreas only transplant.  The patient attended a web based education session that discussed/reviewed the following aspects of transplantation: [employer]OS waitlist management/multiple listings, types of organs offered (KDPI < 85%, KDPI > 85%, PHS increased risk, DCD, HCV+), financial aspects, surgical procedures, dietary instruction pre- and post-transplant, health maintenance pre- and post-transplant, post-transplant hospitalization and outpatient follow-up, potential to participate in a research protocol, and medication management and side effects. A question and answer session was provided after the presentation.    The patient was seen by all members of the multi-disciplinary team to include: Nephrologist/PA, , , Pharmacist and Dietician (if applicable).    The Patient was educated on OPTN policy change regarding race based eGFR. For Black or  Americans, this eGFR could have shown that their kidneys were working better than they were.    Because of this change, we are looking at everyones record and assessing waiting time for people who are eligible. We will be reviewing your medical records and will notify you if you are eligible. We also encouraged patient to provide span 20 labs that are not in our electronic medical records.    The patient reviewed and signed all consents for evaluation which were witnessed and sent to scanning into the [facility] chart.    The patient was given an education book and plan for further evaluation based on her individual assessment.      The patient was encouraged to call with any questions or concerns.

## 2023-12-15 NOTE — PROGRESS NOTES
Kidney Transplant Recipient Reevalulation    Referring Physician: Kirit Masterson  Current Nephrologist: Kirit Masterson  Waitlist Status: inactive  Dialysis Start Date: 11/1/2018    Subjective:     CC:  Annual reassessment of kidney transplant candidacy.    HPI:  Ms. Mcgrath is a 43 y.o. year old Black or  female with ESRD secondary to diabetic nephropathy.  She has been on the wait list for a kidney transplant at [facility] since 5/23/2017. Patient is currently on hemodialysis started on 11/1/2018. Patient is dialyzing on TTS schedule.  Patient reports that she is tolerating dialysis well.. She has a RUE AV graft and LUE AV fistula. Running 4 hours per session, no issues with hypotension during treatments. Patient denies any recent hospitalizations or ED visits.    Functional Status: Does not work but stays busy around the house. Does all of her housework including cooking, cleaning, and laundry. Does yard work and cuts her grass. Able to climb flight of stairs (2 story at home) without CP, SOB, or leg cramping.  Does not appear frail. Also cares for elderly parent    Denies chest pain, SOB, leg pain, abdominal pain or LUTs.    Hospitalizations/ED visits:  none    Interval History:   Has actively working of weight loss since she trying to be a candidate for possible K/P  Last insulin taken 10unit TID novolog; lantus 20 units nightly about 1 montha ago  Now on monjuaro this last month and lost 20lbs .  Only on novolog prn for blood sugars > 300. Reports blood sugars < 180 since starting on monjuaro       CXR:11/29/23 unremarkable  PXR: 11/29/23  per surgeon test result is favorable for transplant  Renal US: 12/30/22 Stable bilateral renal cysts   Iliacs : 11/29/23 per surgeon test result is favorable for transplant  Results for orders placed during the hospital encounter of 12/15/23    Echo    Interpretation Summary    Left Ventricle: The left ventricle is normal in size. Ventricular mass is normal.  Normal wall thickness. Normal wall motion. There is normal systolic function with a visually estimated ejection fraction of 60 - 65%. There is normal diastolic function.    Right Ventricle: Normal right ventricular cavity size. Wall thickness is normal. Right ventricle wall motion  is normal. Systolic function is normal.    Left Atrium: Left atrium is mildly dilated.    Aortic Valve: The aortic valve is a trileaflet valve. There is mild aortic valve sclerosis. There is trace aortic regurgitation.    Tricuspid Valve: There is mild regurgitation.    Pulmonary Artery: The estimated pulmonary artery systolic pressure is 27 mmHg.    IVC/SVC: Normal venous pressure at 3 mmHg.      Results for orders placed during the hospital encounter of 21    Lexiscan Card Interp    Interpretation Summary    Equivocal myocardial perfusion scan.    There is a mild intensity, small sized, equivocal defect that is reversible in the anterior wall(s). This finding is equivocal due to a breast shadow overlying the myocardium.    The visually estimated ejection fraction is normal at rest and normal during stress.    There is normal wall motion at rest and post stress.    LV cavity size is normal at rest and normal at stress.    The EKG portion of this study is negative for ischemia.    The patient reported no chest pain during the stress test.    There were no arrhythmias during stress.    There are no prior studies for comparison.        Colonoscopy: age 45 due  PTH:  Lab Results   Component Value Date    .4 (H) 12/15/2023    CALCIUM 9.5 2023    PHOS 2.6 (L) 2019     PAP: 3/9/22 negative  MM23 negative  GYN follow-up:      Current Outpatient Medications:     amLODIPine (NORVASC) 10 MG tablet, Take 10 mg by mouth once daily., Disp: , Rfl:     BIOTIN ORAL, Take 1 tablet by mouth once daily., Disp: , Rfl:     blood-glucose meter (FREESTYLE SYSTEM KIT) kit, Use as instructed, Disp: 1 each, Rfl: 0     "dextroamphetamine-amphetamine (ADDERALL XR) 20 MG 24 hr capsule, Take 20 mg by mouth every morning., Disp: , Rfl:     dextroamphetamine-amphetamine 10 mg Tab, Take 20 mg by mouth Daily., Disp: , Rfl:     fluticasone propionate (FLONASE) 50 mcg/actuation nasal spray, by Each Nostril route daily as needed., Disp: , Rfl:     HYDROcodone-acetaminophen (NORCO) 7.5-325 mg per tablet, Take 1 tablet by mouth 2 (two) times daily as needed., Disp: , Rfl:     insulin (LANTUS SOLOSTAR U-100 INSULIN) glargine 100 units/mL SubQ pen, Inject 20 Units into the skin once daily., Disp: 15 mL, Rfl: 11    insulin aspart U-100 (NOVOLOG FLEXPEN U-100 INSULIN) 100 unit/mL (3 mL) InPn pen, Inject 12 Units into the skin 3 (three) times daily with meals., Disp: 15 mL, Rfl: 11    lactulose (CHRONULAC) 10 gram/15 mL solution, Take 15 mLs by mouth 2 (two) times daily as needed., Disp: , Rfl:     lancets (ONETOUCH ULTRASOFT LANCETS) Misc, Lancets and strips to check CBGS 4 times per day and with any symptoms of hypoglycemia, Disp: 150 each, Rfl: 11    levocetirizine (XYZAL) 5 MG tablet, Take 5 mg by mouth once daily., Disp: , Rfl:     linaCLOtide (LINZESS) 145 mcg Cap capsule, Take 145 mcg by mouth before breakfast., Disp: , Rfl:     mupirocin (BACTROBAN) 2 % ointment, , Disp: , Rfl:     pantoprazole (PROTONIX) 40 MG tablet, Take 40 mg by mouth once daily., Disp: , Rfl:     pen needle, diabetic 32 gauge x 5/32" Ndle, Use 4 times a day for insulin injection, Disp: 150 each, Rfl: 11    VELPHORO 500 mg Chew, Take 3,000 mg by mouth 3 (three) times daily after meals. 1000 mg with  snacks, Disp: , Rfl:   No current facility-administered medications for this visit.      Past Medical History:   Diagnosis Date    Anemia     Diabetes mellitus     Disorder of kidney and ureter     GERD (gastroesophageal reflux disease)     Hyperlipidemia     Hypertension     IDDM (insulin dependent diabetes mellitus)     Obesity     Preeclampsia        Past Surgical " "History:   Procedure Laterality Date    AV FISTULA PLACEMENT       SECTION       Review of Systems   Constitutional:  Negative for activity change, appetite change and fever.   HENT:  Negative for congestion, mouth sores and sore throat.    Eyes:  Positive for visual disturbance.        Wears glasses   Respiratory:  Negative for cough, chest tightness and shortness of breath.    Cardiovascular:  Negative for chest pain, palpitations and leg swelling.   Gastrointestinal:  Negative for abdominal distention, constipation, diarrhea and nausea.   Genitourinary:  Positive for decreased urine volume. Negative for difficulty urinating, frequency and hematuria.        Still urinating    Musculoskeletal:  Negative for arthralgias, gait problem and myalgias.   Skin:  Negative for wound.   Allergic/Immunologic: Negative for environmental allergies, food allergies and immunocompromised state.   Neurological:  Negative for dizziness, weakness and numbness.   Psychiatric/Behavioral:  Negative for sleep disturbance. The patient is not nervous/anxious.        Objective:   body mass index is 30.34 kg/m².  BP (!) 149/67 (BP Location: Right leg, Patient Position: Sitting, BP Method: Medium (Automatic))   Pulse 100   Temp 97.7 °F (36.5 °C) (Skin)   Resp 16   Ht 5' 4.72" (1.644 m)   Wt 82 kg (180 lb 12.4 oz)   SpO2 100%   BMI 30.34 kg/m²     Physical Exam  Vitals and nursing note reviewed.   Constitutional:       Appearance: Normal appearance. She is obese.   HENT:      Head: Normocephalic.   Cardiovascular:      Rate and Rhythm: Normal rate and regular rhythm.      Heart sounds: Normal heart sounds.   Pulmonary:      Effort: Pulmonary effort is normal.      Breath sounds: Normal breath sounds.   Abdominal:      General: Bowel sounds are normal. There is no distension.      Palpations: Abdomen is soft.      Tenderness: There is no abdominal tenderness.   Musculoskeletal:         General: Normal range of motion.      " Comments: LUE AV fistula + thrill   RUE AV graft + thrill   Skin:     General: Skin is warm and dry.   Neurological:      General: No focal deficit present.      Mental Status: She is alert.   Psychiatric:         Behavior: Behavior normal.         Labs:  Lab Results   Component Value Date    WBC 11.2 (H) 05/10/2021    HGB 11.4 (L) 05/10/2021    HCT 35.4 05/10/2021     (L) 04/27/2023    K 3.7 04/27/2023    CL 93 (L) 04/27/2023    CO2 21 (L) 04/27/2023    BUN 59 (H) 04/27/2023    CREATININE 7.64 (H) 04/27/2023    EGFRNONAA 6 (L) 05/10/2021    EGFRIFAFRICA 7 (L) 05/10/2021    GLUCOSE 216 (H) 05/10/2021    CALCIUM 9.5 04/27/2023    PHOS 2.6 (L) 04/03/2019    MG 1.7 (L) 09/06/2018    ALBUMIN 3.5 05/10/2021    AST 13 05/10/2021    ALT 8 05/10/2021    .4 (H) 12/15/2023       Lab Results   Component Value Date    CPRA 3 09/21/2023    WI6WFVH A25 09/21/2023    CIABCLM WEAK A43 09/21/2023    CIIAB DPB1*01:01 06/22/2023    ABCMT NONE 09/21/2023       Labs were reviewed with the patient.    Pre-transplant Workup:   Reviewed with the patient.    Assessment:     1. Patient on waiting list for kidney transplant    2. ESRD (end stage renal disease)    3. Type 2 diabetes mellitus with chronic kidney disease on chronic dialysis, with long-term current use of insulin    4. Essential hypertension    5. Pulmonary hypertension          Plan:   Nov 15th [facility] had PAP---please obtain  [phone]    Actively losing weight for K/P vs Kidney alone.   c-peptide 2.92. would need ABD US and Surgery visit if candidate    Panc candidacy needs to be discussed in committee. Weight lost 235---195---180  She is on monjuaro. No longer needing insulin    Transplant Candidacy:   Ms. Mcgrath is a suitable kidney transplant candidate. Choosing to be inactive in hope for   Meets center eligibility for accepting HCV+ donor offer - yes.  Patient educated on HCV+ donors. Korey is willing  to accept HCV+ donor offer -  yes    Patient is a candidate for KDPI > 85 kidney donor offer - no (weight >88kg).  She remains in overall stable health, and will remain active on the transplant list.    Sarah Liang NP       Follow-up:   In addition to the tests noted in the plan, Ms. Mcgrath will continue to have reevaluation as per the standing pre-kidney transplant protocol:  Monthly blood for PRA  Annual return to clinic, except HIV positive, > 65 years of age, or pancreas transplant candidates who will be scheduled to see transplant every 6 months while in pre-transplant phase  Annual re-testing: CXR, EKG, yearly mammograms for women over 40 and PSA for males over 40, cardiology follow-up as recommended by initial cardiology pre-transplant evaluation  Renal ultrasound every 2 years  Baseline colonoscopy after age 50 and repeated as recommended    UNOS Patient Status  Functional Status: 60% - Requires occasional assistance but is able to care for needs  Physical Capacity: No Limitations

## 2023-12-19 ENCOUNTER — TELEPHONE (OUTPATIENT)
Dept: TRANSPLANT | Facility: CLINIC | Age: 43
End: 2023-12-19
Payer: MEDICARE

## 2023-12-19 NOTE — TELEPHONE ENCOUNTER
.MA notes per Adherence form     FOR THE PAST THREE MONTHS:    0-AMA's  0-No-shows    No concerns with care giving, transportation, or mental health    Scanned in pt's media    Judith Villasenor  Abdominal Transplant MA

## 2023-12-29 ENCOUNTER — COMMITTEE REVIEW (OUTPATIENT)
Dept: TRANSPLANT | Facility: CLINIC | Age: 43
End: 2023-12-29
Payer: MEDICARE

## 2023-12-29 NOTE — COMMITTEE REVIEW
Native Organ Dx: Diabetes Mellitus - Type Other / Unknown      Unable to determine transplant candidacy for KP at this time due to need for continued weight loss, see Endocrine for insulin/diabetes management. Once she reaches BMI less than 30 and we receive Endocrine notes reflecting if she is on insulin, we can discuss with committee if a pancreas will benefit her. She is a type II diabetic and HgA1c is 9.0 in December 2023.      Note written by Georgiana Eid RN    ===============================================  I was present at the meeting and attest to the decision of the committee.       Rakel Charles, DO  Transplant Nephrology

## 2024-01-16 NOTE — PROGRESS NOTES
Transplant Psychosocial Evaluation Update:  Last psychosocial evaluation for transplant was completed on 12/30/22 by Marie    Pt presents today in company of son, Sukhdeep. Pt and son  present as alert, oriented to person, place, time, purpose of visit. Pt and cousin deny concerns about going through with transplant and state motivation and sense of preparedness for transplantation, caregiver role, psychosocial risk factors, medical risk factors, financial aspects, and lifetime commitments. All concerns and education points as per transplant psychosocial evaluation process addressed (also refer to psychosocial dated 3/12/2021). Pt actively participated in today's interview, with cousin participating as caregiver. Pt asking questions appropriately and denies changes to previous psychosocial evaluation for transplantation which we reviewed line by line with pt and, per pt choice, with pts caregiver today.    Primary Caregivers and Transportation for Transplant:  1. Sukhdeep Jacobs, 18 y/o son, drives, [phone]  2. Ramandeep Moreno, 53 y/o aunt, drives [phone]  3. Clara Samanoisse, niece, drives [phone]  4. Carli Mcgrath, 76 y/o father, drives [phone]    Both pt and caregiver/s plan to stay locally at lodging arranged by themselves. Caregivers plan to stay at hospital as appropriate for transplant and post-transplant process.    Pts Vocation: Pt is disabled.  Last day worked:  disability began 11/2018.    DME: diabetic equipment and supplies.     ADLS:  Pt states ability to independently accomplish all adls.     Household and Dependents: pt resides with her two sons and has 1 dependent at this time.Harinder Jacobs [phone]). Harinder's father and grandmother will provide their care while pt stays in [facility] post-op.     Income: Pt states ability to afford all monthly expenses and costs including for medications now and if transplanted based on income and on savings and assets.    Dialysis Adherence: Pt  "reports good compliance with all dialysis treatments.  Dialysis compliance found under "Media" tab->"dialysis compliance".  Compliance reported as satisfactory.     Pt and family deny any additional concerns, stating preparedness and motivation to proceed with organ transplant.     Suitability for Transplant:   Pt remains low risk for transplant at this time based on psychosocial risk factors and strengths.    Pt trino well overall with health needs and life in general, denies current or past suicidal/homicidal ideation,  has stable supports, adequate insurance, financial stability, transportation and caregiver plans in place, and is using no substances unless prescribed.     Payer/Plan Subscr  Sex Relation Sub. Ins. ID Effective Group Num   1. MEDICARE - [place]* VINCENT ENRIQUEZ 1980 Female Self [id number] 19                                    [address]   2. [place] VINCENT ENRIQUEZ 1980 Female Self [id number]* 20                                    [address] [address]        SW recommends that pt conduct fundraising to assist pt with pay for cost of medications, food, gas, and other transplant related needs. SW recommends that pt remain aware of potential mental health concerns and contact the team if any concerns arise. SW recommends that pt remain abstinent from tobacco, ETOH, and drug use. SW supports pt's continued dialysis adherence. SW remains available to answer any questions or concerns that arise as the pt moves through the transplant process.      Saul Blackmon, LUCI, LMSW        "

## 2024-02-23 ENCOUNTER — OFFICE VISIT (OUTPATIENT)
Dept: ENDOCRINOLOGY | Facility: CLINIC | Age: 44
End: 2024-02-23
Payer: MEDICARE

## 2024-02-23 VITALS
DIASTOLIC BLOOD PRESSURE: 81 MMHG | HEIGHT: 64 IN | HEART RATE: 103 BPM | RESPIRATION RATE: 16 BRPM | SYSTOLIC BLOOD PRESSURE: 116 MMHG | BODY MASS INDEX: 28.62 KG/M2 | TEMPERATURE: 98 F | WEIGHT: 167.63 LBS

## 2024-02-23 DIAGNOSIS — E11.65 UNCONTROLLED TYPE 2 DIABETES MELLITUS WITH HYPERGLYCEMIA: Primary | ICD-10-CM

## 2024-02-23 LAB — HBA1C MFR BLD: 6.9 %

## 2024-02-23 PROCEDURE — 99215 OFFICE O/P EST HI 40 MIN: CPT | Mod: PBBFAC,TXP | Performed by: NURSE PRACTITIONER

## 2024-02-23 PROCEDURE — 83036 HEMOGLOBIN GLYCOSYLATED A1C: CPT | Mod: PBBFAC,NTX | Performed by: NURSE PRACTITIONER

## 2024-02-23 PROCEDURE — 99214 OFFICE O/P EST MOD 30 MIN: CPT | Mod: S$PBB,NTX,, | Performed by: NURSE PRACTITIONER

## 2024-02-23 NOTE — PROGRESS NOTES
Subjective     Patient ID: Korey Mcgrath is a 43 y.o. female.    Chief Complaint: Follow-up DM 2 (Denies problems)    04/29/2020 Telemedicine Visit Note- Endocrine: 39 year old female referred to Endocrine clinic as new patient for uncontrolled diabetes with end-stage renal disease on dialysis insulin-dependent.  Due to COVID 19 restrictions and precautions telemedicine visit (audio and video) is being utilized today.  Patient states that she is a type I diabetic and was diagnosed at 15 years old.  She is currently end-stage renal disease on hemodialysis and goes to dialysis on Tuesday/Thursday/Saturday.  Patient's current A1c is 12.1 previous A1c 10.6 and 9.1.  Patient states she is on Lantus 20 units and NovoLog 10 units with a correction scale.  Patient unsure of correction scale she states that she has been using it so long that she just knows it in her head.  When asked if her blood sugar was at 250 she states she would give 4 extra units.  Likely patient is on a 1:25 > 150 correction scale.  Patient states that she does have hypoglycemia especially after dialysis.  She states sometimes after dialysis she gets weak and her blood sugar is 60.  Patient states that she is not aware of low blood sugar into lows in the 50s or 60s.  Patient has hypoglycemia unawareness.  Patient checks her blood sugars 4 times a day.  Patient reports that with dialysis they have been decreasing her insulin and blood sugars have increased.  Discussed with patient Dexcom, she states that both her sons are diabetic and her older son does have a Dexcom G6 and she is familiar and feels like a continuous glucose monitor would benefit her.  Discussed with patient that we will keep a CBG log x2 weeks with insulin doses in order to titrate insulin.  Once log is received instructed patient I will work with her to adjust insulins to improve her diabetes. [1]      09/04/2020 Endocrine Clinic Note: 40-year-old female scheduled for endocrine  clinic follow-up today for uncontrolled type II day diabetes with end-stage renal disease on dialysis insulin-dependent, hypertension, hyperlipidemia and vitamin D deficiency. Uncontrolled type 2 diabetes insulin-dependent due to CKD and on dialysis (Tues, Thru, Sat) current A1c 11.8.  Previous A1c 12.1 pt check CBG's 4 times a day. Her Glucometer she brought she has occasional CBG checks with hyperglycemia she states she forgot her other meter at home.  Patient reports hypoglycemic episode on her dialysis days with blood sugar sometimes in the 40s and 50s.  She states on days of dialysis a lot of time she awakens sweaty weak and clammy.  She denies any hypoglycemia on nondialysis days.  End-stage renal disease on dialysis patient is currently on [facility] transplant list for double organ. Hyperlipidemia Total 159 HDL 53 triglycerides 112 LDL 84 at goal on a statin per ADA guidelines.  Hypertension at goal.  Vitamin D deficiency Vitamin D level 39.9 on 4/29/2020. [1]      01/06/2021 Endocrine Clinic Note: 40 year old female scheduled today for uncontrolled type 2 diabetes with end-stage renal disease on dialysis insulin-dependent, hypertension, hyperlipidemia and vitamin D deficiency. Uncontrolled type 2 diabetes patient's current A1C 6.7 improved from previous A1C 12.1, 10.6 and 9.1. On patient's previous visit she was having hypoglycemia on dialysis days and insulin was reduced to 5 units w/ meals w/ correction on her dialysis days.  Patient is wearing a Dexcom average glucose is 201 and average on best day 169.  Patient at times has elevated glucose while on dialysis she states she does not eat is just certain days with dialysis.  Patient has hypoglycemic episodes mainly at noon patient states that time she gives her prandial insulin late because she is on the go or does not eat as much.  Patient also has 2 sons that are type I and she is carb counting with 1 son and we will convert her to carb counting.  Patient  states having the Dexcom has helped her improve her glucose. [1]     12/8/2021 Endocrine clinic note: 40-year-old female scheduled today for endocrine clinic follow-up.  History of type 2 diabetes with end-stage renal disease on dialysis insulin-dependent, hypertension, hyperlipidemia and vitamin D deficiency.  Type 2 diabetes current A1C 6.7 Previous A1c 6.7, 12.1, 10.6 and 9.1.  Patient has not been seen in endocrine clinic for 11 months due to missed appointments.  Patient returns today to be reestablished endocrine clinic.  Patient is wearing a Dexcom average glucose 189.  Patient has episodes of hypoglycemia with glucose being elevated prior 2 to 3 hours indicating late prandial dosing.  Patient states on one episode she was shopping with her mother and forgot her insulin and gave her insulin when she returned home.  At other times patient overcorrected.  Discussed prandial dosing with patient end-stage renal disease patient is currently on dialysis Tuesday/Thursday/Saturday and is currently on the renal transplant list in [place] and [place].        The patient location is: Dialysis   The chief complaint leading to consultation is: Type 2 Diabetes      Visit type: audiovisual     Face to Face time with patient: 15  30 minutes of total time spent on the encounter, which includes face to face time and non-face to face time preparing to see the patient (eg, review of tests), Obtaining and/or reviewing separately obtained history, Documenting clinical information in the electronic or other health record, Independently interpreting results (not separately reported) and communicating results to the patient/family/caregiver, or Care coordination (not separately reported).  Each patient to whom he or she provides medical services by telemedicine is:  (1) informed of the relationship between the physician and patient and the respective role of any other health care provider with respect to management of the patient;  and (2) notified that he or she may decline to receive medical services by telemedicine and may withdraw from such care at any time. Endocrine Clinic Notes: 08/22/2023:  43-year-old female scheduled today as endocrine clinic follow-up.  Previously seen 12/08/2021.  No recent A1c on patient states her recent A1c was 9.0.  Patient has a Dexcom has not being worn since she states she lost her transmitter maxMediWound disc report 07/14/2023 through 07/27/2023.  Days with CGM data 71% 10 of 14 days.  Average glucose 232.  35% very high, 39% high, 25% in range, less than 1% low, less than 1% very low.  Patient had a pattern of daytime and nighttime.  On patient's best day average glucose 160 70% in range no hypoglycemia noted.  On other days patient has prandial spikes above 400 for multiple hours with sudden drop hypoglycemia indicating patient is forgetting to take prandial insulin at times taking late leading to hypoglycemia.  Patient has not had her Dexcom due to not having transmitters asked to be free to a G7 paperwork will be sent to bring patient to the G7. Instructed patient's samples G7 is available the office she will come by after dialysis to  samples of the G7 to start until her supplies come in.       Endocrine clinic note 02/23/2024:  43-year-old female scheduled today for endocrine clinic follow-up.  History of type 2 diabetes with end-stage renal disease on dialysis, insulin dependent on transplant list.  Patient returns to clinic today current A1c has improved to 6.9 from previous 9.0.  Previously patient had Mounjaro added to her regimen on for 3-1/2 months is currently on 5 mg.  Patient's A1c is been obtained was in goal also patient needed to lose weight for her transplant to have dual organ transplant.  Patient has a Dexcom only has 1 day of data unable to interpret.  On patient's 1 day she would blood glucose of 186 w/ 56% in range.            Review of Systems   Constitutional:  Negative for  activity change, appetite change and fatigue.   HENT:  Negative for dental problem, hearing loss, tinnitus, trouble swallowing and goiter.    Eyes:  Negative for photophobia, pain and visual disturbance.   Respiratory:  Negative for cough, chest tightness and wheezing.    Cardiovascular:  Negative for chest pain, palpitations and leg swelling.   Gastrointestinal:  Negative for abdominal pain, constipation, diarrhea, nausea and reflux.   Endocrine: Negative for cold intolerance, heat intolerance, polydipsia and polyphagia.   Genitourinary:  Negative for difficulty urinating, flank pain, hematuria, hot flashes, menstrual irregularity, menstrual problem, nocturia and urgency.   Musculoskeletal:  Negative for back pain, gait problem, joint swelling, leg pain and joint deformity.   Integumentary:  Negative for color change, pallor, rash and breast discharge.   Allergic/Immunologic: Negative for environmental allergies, food allergies and immunocompromised state.   Neurological:  Negative for tremors, seizures, headaches, memory loss and coordination difficulties.   Psychiatric/Behavioral:  Negative for agitation, behavioral problems and sleep disturbance. The patient is not nervous/anxious.           Objective     Physical Exam  Constitutional:       General: She is not in acute distress.     Appearance: Normal appearance. She is not ill-appearing.   HENT:      Head: Normocephalic and atraumatic.      Right Ear: External ear normal.      Left Ear: External ear normal.      Nose: Nose normal. No congestion or rhinorrhea.      Mouth/Throat:      Mouth: Mucous membranes are moist.      Pharynx: Oropharynx is clear. No oropharyngeal exudate.   Eyes:      General:         Right eye: No discharge.         Left eye: No discharge.      Conjunctiva/sclera: Conjunctivae normal.      Pupils: Pupils are equal, round, and reactive to light.   Neck:      Thyroid: No thyroid mass, thyromegaly or thyroid tenderness.   Cardiovascular:       Rate and Rhythm: Normal rate and regular rhythm.      Pulses: Normal pulses.      Heart sounds: Normal heart sounds. No murmur heard.  Pulmonary:      Effort: Pulmonary effort is normal. No respiratory distress.      Breath sounds: Normal breath sounds.   Abdominal:      General: Abdomen is flat. Bowel sounds are normal. There is no distension.      Palpations: Abdomen is soft.      Tenderness: There is no abdominal tenderness.   Musculoskeletal:         General: No swelling or tenderness. Normal range of motion.      Cervical back: Normal range of motion and neck supple. No tenderness.      Right lower leg: No edema.      Left lower leg: No edema.   Feet:      Right foot:      Skin integrity: Skin integrity normal.      Left foot:      Skin integrity: Skin integrity normal.   Lymphadenopathy:      Cervical: No cervical adenopathy.   Skin:     General: Skin is warm and dry.      Coloration: Skin is not jaundiced or pale.   Neurological:      General: No focal deficit present.      Mental Status: She is alert and oriented to person, place, and time. Mental status is at baseline.      Coordination: Coordination normal.      Gait: Gait normal.   Psychiatric:         Mood and Affect: Mood normal.         Behavior: Behavior normal.         Thought Content: Thought content normal.            Assessment and Plan     1. Uncontrolled type 2 diabetes mellitus with hyperglycemia  Recent A1C 6.9   Medication Lantus 12 units once a day, Novolog 10 units twice day, Mounjaro 5 mg q week  changes: Increase Mounjaro to 7.5 mg decrease insulin as needed    A1C goal <7.0   Follow ADA diet, avoid soda, simple sweets, and limit rice, breads and starches  Urine Micro: 02/23/2024   Yearly Diabetic eye exam: Request records [facility]   Yearly Foot Exam: 02/23/2024   -     Hemoglobin A1C, POCT  -     Microalbumin/Creatinine Ratio, Urine  -     tirzepatide 7.5 mg/0.5 mL PnIj; Inject 7.5 mg into the skin every 7 days.  Dispense: 4 Pen; Refill:  5      Follow up in about 6 months (around 8/23/2024) for Type 2 diabetes, w/ Dexcom.    I spent a total of 30 minutes on the day of the visit.This includes face to face time and non-face to face time preparing to see the patient (eg, review of tests), obtaining and/or reviewing separately obtained history, documenting clinical information in the electronic or other health record, independently interpreting results and communicating results to the patient/family/caregiver, or care coordinator.

## 2024-03-04 ENCOUNTER — PATIENT OUTREACH (OUTPATIENT)
Dept: ENDOCRINOLOGY | Facility: CLINIC | Age: 44
End: 2024-03-04
Payer: MEDICARE

## 2024-03-08 ENCOUNTER — COMMITTEE REVIEW (OUTPATIENT)
Dept: TRANSPLANT | Facility: CLINIC | Age: 44
End: 2024-03-08
Payer: MEDICARE

## 2024-03-08 DIAGNOSIS — Z76.82 ORGAN TRANSPLANT CANDIDATE: Primary | ICD-10-CM

## 2024-03-08 NOTE — COMMITTEE REVIEW
Native Organ Dx: Diabetes Mellitus - Type Other / Unknown      Unable to determine transplant candidacy at this time due to need for CT abdomen/pelvis without contrast for surgeon review. If films reveal that she is a surgical candidate for KP, bring in for surgeon only visit for KP consideration (last surgeon visit was 4/2019 so will need update).       Note written by Georgiana Eid RN    ===============================================    I was present at the meeting and attest to the general consensus of the committee.   Dereje Ospina Jr.

## 2024-03-11 ENCOUNTER — TELEPHONE (OUTPATIENT)
Dept: TRANSPLANT | Facility: CLINIC | Age: 44
End: 2024-03-11
Payer: MEDICARE

## 2024-03-28 ENCOUNTER — TELEPHONE (OUTPATIENT)
Dept: TRANSPLANT | Facility: CLINIC | Age: 44
End: 2024-03-28
Payer: MEDICARE

## 2024-04-01 ENCOUNTER — TELEPHONE (OUTPATIENT)
Dept: TRANSPLANT | Facility: CLINIC | Age: 44
End: 2024-04-01
Payer: MEDICARE

## 2024-05-20 ENCOUNTER — HOSPITAL ENCOUNTER (OUTPATIENT)
Dept: RADIOLOGY | Facility: HOSPITAL | Age: 44
Discharge: HOME | End: 2024-05-20
Attending: NURSE PRACTITIONER
Payer: MEDICARE

## 2024-05-20 DIAGNOSIS — Z76.82 ORGAN TRANSPLANT CANDIDATE: ICD-10-CM

## 2024-05-20 PROCEDURE — 74176 CT ABD & PELVIS W/O CONTRAST: CPT | Mod: TC,TXP

## 2024-05-22 ENCOUNTER — TELEPHONE (OUTPATIENT)
Dept: TRANSPLANT | Facility: CLINIC | Age: 44
End: 2024-05-22
Payer: MEDICARE

## 2024-05-22 NOTE — TELEPHONE ENCOUNTER
----- Message from Saw Chirinos Jr., MD sent at 5/22/2024  9:39 AM CDT -----  Looks acceptable for KP  ----- Message -----  From: Georgiana Eid RN  Sent: 5/22/2024   6:58 AM CDT  To: Saw Chirinos Jr., MD    Patient wants a KP. Per committee in March, get CT and if acceptable, bring to clinic for surgeon only visit for KP since last seen by surgeon in 2019

## 2024-05-24 ENCOUNTER — TELEPHONE (OUTPATIENT)
Dept: TRANSPLANT | Facility: CLINIC | Age: 44
End: 2024-05-24
Payer: MEDICARE

## 2024-05-24 NOTE — TELEPHONE ENCOUNTER
Spoke to pt confirming scheduled clinic visit appt on 07/12/2024. Appt reminder mailed on 05/24/2024 and pt is aware to bring care giver.

## 2024-07-01 ENCOUNTER — TELEPHONE (OUTPATIENT)
Dept: TRANSPLANT | Facility: CLINIC | Age: 44
End: 2024-07-01
Payer: MEDICARE

## 2024-07-08 ENCOUNTER — TELEPHONE (OUTPATIENT)
Dept: TRANSPLANT | Facility: CLINIC | Age: 44
End: 2024-07-08
Payer: MEDICARE

## 2024-07-08 NOTE — LETTER
2024    Korey Mcgrath  [address]  [place] [address]        Dear Korey Mcgrath:  MRN: [id number]  : 1980    You are scheduled on 2024 for follow up in the transplant clinic to update your records and evaluate your health status as you wait for a transplant.  It is very important that we ensure you are ready for your transplant.      Please make arrangements to be in our transplant clinic from 12:30 pm- 4 pm.  During this time you will watch an educational video and then be seen by our transplant , financial counselor, and advance practice provider.     If you have had a change in your medical history since your last visit, please call your transplant coordinator to ensure we have the medical records to review prior to your appointment.     Please bring the following with you to this appointment:  A Caregiver is REQUIRED  Bring ALL insurance information including medication card  Current list of medications  Copies of any outside medical records from the past year, such as: mammogram, pap smear, ultrasound, CAT scan, colonoscopy, cardiac angiogram or cardiac stress test    To reschedule your appointments please call [phone] or [phone] ([phone]). Please ask for the .      Failure to cancel in advance or arrive on time could result in a $50 cancellation fee.  Your transplant candidacy could be affected by no showing these appointments.  We look forward to seeing you.    Sincerely,    [facility] [place]  [facility] [place] [address]  [phone]

## 2024-08-14 DIAGNOSIS — E11.65 UNCONTROLLED TYPE 2 DIABETES MELLITUS WITH HYPERGLYCEMIA: ICD-10-CM

## 2024-08-14 RX ORDER — TIRZEPATIDE 7.5 MG/.5ML
7.5 INJECTION, SOLUTION SUBCUTANEOUS
Qty: 2 ML | Refills: 5 | Status: SHIPPED | OUTPATIENT
Start: 2024-08-14

## 2024-08-28 DIAGNOSIS — Z76.82 PRE-KIDNEY TRANSPLANT, LISTED: Primary | ICD-10-CM

## 2024-09-12 ENCOUNTER — TELEPHONE (OUTPATIENT)
Dept: TRANSPLANT | Facility: CLINIC | Age: 44
End: 2024-09-12
Payer: MEDICARE

## 2024-10-09 DIAGNOSIS — Z76.82 PRE-KIDNEY TRANSPLANT, LISTED: Primary | ICD-10-CM

## 2024-10-09 DIAGNOSIS — N18.6 END STAGE RENAL DISEASE: Primary | ICD-10-CM

## 2024-10-09 DIAGNOSIS — Z76.82 ORGAN TRANSPLANT CANDIDATE: ICD-10-CM

## 2024-10-14 ENCOUNTER — TELEPHONE (OUTPATIENT)
Dept: TRANSPLANT | Facility: CLINIC | Age: 44
End: 2024-10-14
Payer: MEDICARE

## 2024-10-22 ENCOUNTER — TELEPHONE (OUTPATIENT)
Dept: TRANSPLANT | Facility: CLINIC | Age: 44
End: 2024-10-22
Payer: MEDICARE

## 2024-10-23 ENCOUNTER — TELEPHONE (OUTPATIENT)
Dept: TRANSPLANT | Facility: CLINIC | Age: 44
End: 2024-10-23
Payer: MEDICARE

## 2024-10-29 ENCOUNTER — TELEPHONE (OUTPATIENT)
Dept: TRANSPLANT | Facility: CLINIC | Age: 44
End: 2024-10-29
Payer: MEDICARE

## 2024-12-10 DIAGNOSIS — E11.65 UNCONTROLLED TYPE 2 DIABETES MELLITUS WITH HYPERGLYCEMIA, WITHOUT LONG-TERM CURRENT USE OF INSULIN: ICD-10-CM

## 2024-12-10 RX ORDER — LANCETS 28 GAUGE
EACH MISCELLANEOUS
Qty: 120 EACH | Refills: 2 | Status: SHIPPED | OUTPATIENT
Start: 2024-12-10

## 2024-12-10 RX ORDER — INSULIN ASPART 100 [IU]/ML
INJECTION, SOLUTION INTRAVENOUS; SUBCUTANEOUS
Qty: 15 ML | Refills: 0 | Status: SHIPPED | OUTPATIENT
Start: 2024-12-10

## 2024-12-10 RX ORDER — INSULIN GLARGINE 100 [IU]/ML
20 INJECTION, SOLUTION SUBCUTANEOUS
Qty: 15 ML | Refills: 1 | Status: SHIPPED | OUTPATIENT
Start: 2024-12-10

## 2025-01-07 ENCOUNTER — PATIENT MESSAGE (OUTPATIENT)
Dept: TRANSPLANT | Facility: CLINIC | Age: 45
End: 2025-01-07
Payer: MEDICARE

## 2025-01-07 DIAGNOSIS — Z76.82 PRE-KIDNEY TRANSPLANT, LISTED: Primary | ICD-10-CM

## 2025-01-08 ENCOUNTER — TELEPHONE (OUTPATIENT)
Dept: TRANSPLANT | Facility: CLINIC | Age: 45
End: 2025-01-08
Payer: MEDICARE

## 2025-01-09 ENCOUNTER — TELEPHONE (OUTPATIENT)
Dept: TRANSPLANT | Facility: CLINIC | Age: 45
End: 2025-01-09
Payer: MEDICARE

## 2025-01-09 NOTE — TELEPHONE ENCOUNTER
MA notes per Adherence form     FOR THE PAST THREE MONTHS:    0-AMA's  0-No-shows    No concerns with care giving, transportation, or mental health    Scanned in pt's media    Judith Villasenor  Abdominal Transplant MA

## 2025-01-16 ENCOUNTER — TELEPHONE (OUTPATIENT)
Dept: TRANSPLANT | Facility: CLINIC | Age: 45
End: 2025-01-16
Payer: MEDICARE

## 2025-02-03 DIAGNOSIS — E11.65 UNCONTROLLED TYPE 2 DIABETES MELLITUS WITH HYPERGLYCEMIA: ICD-10-CM

## 2025-02-03 RX ORDER — TIRZEPATIDE 7.5 MG/.5ML
7.5 INJECTION, SOLUTION SUBCUTANEOUS
Qty: 2 ML | Refills: 5 | OUTPATIENT
Start: 2025-02-03

## 2025-02-03 RX ORDER — TIRZEPATIDE 7.5 MG/.5ML
INJECTION, SOLUTION SUBCUTANEOUS
Qty: 12 ML | Refills: 0 | OUTPATIENT
Start: 2025-02-03

## 2025-02-04 ENCOUNTER — OFFICE VISIT (OUTPATIENT)
Dept: ENDOCRINOLOGY | Facility: CLINIC | Age: 45
End: 2025-02-04
Payer: MEDICARE

## 2025-02-04 VITALS — SYSTOLIC BLOOD PRESSURE: 123 MMHG | DIASTOLIC BLOOD PRESSURE: 79 MMHG

## 2025-02-04 DIAGNOSIS — E11.65 UNCONTROLLED TYPE 2 DIABETES MELLITUS WITH HYPERGLYCEMIA, WITHOUT LONG-TERM CURRENT USE OF INSULIN: ICD-10-CM

## 2025-02-04 DIAGNOSIS — N18.6 ESRD (END STAGE RENAL DISEASE) ON DIALYSIS: ICD-10-CM

## 2025-02-04 DIAGNOSIS — E11.65 UNCONTROLLED TYPE 2 DIABETES MELLITUS WITH HYPERGLYCEMIA: Primary | ICD-10-CM

## 2025-02-04 DIAGNOSIS — Z99.2 ESRD (END STAGE RENAL DISEASE) ON DIALYSIS: ICD-10-CM

## 2025-02-04 PROCEDURE — 98006 SYNCH AUDIO-VIDEO EST MOD 30: CPT | Mod: 95,NTX,, | Performed by: NURSE PRACTITIONER

## 2025-02-04 RX ORDER — INSULIN GLARGINE 100 [IU]/ML
20 INJECTION, SOLUTION SUBCUTANEOUS DAILY
Qty: 15 ML | Refills: 11 | Status: SHIPPED | OUTPATIENT
Start: 2025-02-04

## 2025-02-04 RX ORDER — TIRZEPATIDE 7.5 MG/.5ML
7.5 INJECTION, SOLUTION SUBCUTANEOUS
Qty: 2 ML | Refills: 11 | Status: SHIPPED | OUTPATIENT
Start: 2025-02-04

## 2025-02-04 RX ORDER — PEN NEEDLE, DIABETIC 30 GX3/16"
NEEDLE, DISPOSABLE MISCELLANEOUS
Qty: 150 EACH | Refills: 11 | Status: SHIPPED | OUTPATIENT
Start: 2025-02-04

## 2025-02-04 RX ORDER — INSULIN ASPART 100 [IU]/ML
INJECTION, SOLUTION INTRAVENOUS; SUBCUTANEOUS
Qty: 15 ML | Refills: 11 | Status: SHIPPED | OUTPATIENT
Start: 2025-02-04

## 2025-02-04 NOTE — PROGRESS NOTES
Subjective     Patient ID: Korey Mcgrath is a 44 y.o. female.    Chief Complaint: Diabetes    04/29/2020 Telemedicine Visit Note- Endocrine: 39 year old female referred to Endocrine clinic as new patient for uncontrolled diabetes with end-stage renal disease on dialysis insulin-dependent.  Due to COVID 19 restrictions and precautions telemedicine visit (audio and video) is being utilized today.  Patient states that she is a type I diabetic and was diagnosed at 15 years old.  She is currently end-stage renal disease on hemodialysis and goes to dialysis on Tuesday/Thursday/Saturday.  Patient's current A1c is 12.1 previous A1c 10.6 and 9.1.  Patient states she is on Lantus 20 units and NovoLog 10 units with a correction scale.  Patient unsure of correction scale she states that she has been using it so long that she just knows it in her head.  When asked if her blood sugar was at 250 she states she would give 4 extra units.  Likely patient is on a 1:25 > 150 correction scale.  Patient states that she does have hypoglycemia especially after dialysis.  She states sometimes after dialysis she gets weak and her blood sugar is 60.  Patient states that she is not aware of low blood sugar into lows in the 50s or 60s.  Patient has hypoglycemia unawareness.  Patient checks her blood sugars 4 times a day.  Patient reports that with dialysis they have been decreasing her insulin and blood sugars have increased.  Discussed with patient Dexcom, she states that both her sons are diabetic and her older son does have a Dexcom G6 and she is familiar and feels like a continuous glucose monitor would benefit her.  Discussed with patient that we will keep a CBG log x2 weeks with insulin doses in order to titrate insulin.  Once log is received instructed patient I will work with her to adjust insulins to improve her diabetes. [1]      09/04/2020 Endocrine Clinic Note: 40-year-old female scheduled for endocrine clinic follow-up today for  uncontrolled type II day diabetes with end-stage renal disease on dialysis insulin-dependent, hypertension, hyperlipidemia and vitamin D deficiency. Uncontrolled type 2 diabetes insulin-dependent due to CKD and on dialysis (Tues, Thru, Sat) current A1c 11.8.  Previous A1c 12.1 pt check CBG's 4 times a day. Her Glucometer she brought she has occasional CBG checks with hyperglycemia she states she forgot her other meter at home.  Patient reports hypoglycemic episode on her dialysis days with blood sugar sometimes in the 40s and 50s.  She states on days of dialysis a lot of time she awakens sweaty weak and clammy.  She denies any hypoglycemia on nondialysis days.  End-stage renal disease on dialysis patient is currently on [facility] transplant list for double organ. Hyperlipidemia Total 159 HDL 53 triglycerides 112 LDL 84 at goal on a statin per ADA guidelines.  Hypertension at goal.  Vitamin D deficiency Vitamin D level 39.9 on 4/29/2020. [1]      01/06/2021 Endocrine Clinic Note: 40 year old female scheduled today for uncontrolled type 2 diabetes with end-stage renal disease on dialysis insulin-dependent, hypertension, hyperlipidemia and vitamin D deficiency. Uncontrolled type 2 diabetes patient's current A1C 6.7 improved from previous A1C 12.1, 10.6 and 9.1. On patient's previous visit she was having hypoglycemia on dialysis days and insulin was reduced to 5 units w/ meals w/ correction on her dialysis days.  Patient is wearing a Dexcom average glucose is 201 and average on best day 169.  Patient at times has elevated glucose while on dialysis she states she does not eat is just certain days with dialysis.  Patient has hypoglycemic episodes mainly at noon patient states that time she gives her prandial insulin late because she is on the go or does not eat as much.  Patient also has 2 sons that are type I and she is carb counting with 1 son and we will convert her to carb counting.  Patient states having the Dexcom  has helped her improve her glucose. [1]     12/8/2021 Endocrine clinic note: 40-year-old female scheduled today for endocrine clinic follow-up.  History of type 2 diabetes with end-stage renal disease on dialysis insulin-dependent, hypertension, hyperlipidemia and vitamin D deficiency.  Type 2 diabetes current A1C 6.7 Previous A1c 6.7, 12.1, 10.6 and 9.1.  Patient has not been seen in endocrine clinic for 11 months due to missed appointments.  Patient returns today to be reestablished endocrine clinic.  Patient is wearing a Dexcom average glucose 189.  Patient has episodes of hypoglycemia with glucose being elevated prior 2 to 3 hours indicating late prandial dosing.  Patient states on one episode she was shopping with her mother and forgot her insulin and gave her insulin when she returned home.  At other times patient overcorrected.  Discussed prandial dosing with patient end-stage renal disease patient is currently on dialysis Tuesday/Thursday/Saturday and is currently on the renal transplant list in [place] and [place].     Telemedicine Endocrine Clinic Notes: 08/22/2023:  43-year-old female scheduled today as endocrine clinic follow-up.  Previously seen 12/08/2021.  No recent A1c on patient states her recent A1c was 9.0.  Patient has a Dexcom has not being worn since she states she lost her transmitter [employer] disc report 07/14/2023 through 07/27/2023.  Days with CGM data 71% 10 of 14 days.  Average glucose 232.  35% very high, 39% high, 25% in range, less than 1% low, less than 1% very low.  Patient had a pattern of daytime and nighttime.  On patient's best day average glucose 160 70% in range no hypoglycemia noted.  On other days patient has prandial spikes above 400 for multiple hours with sudden drop hypoglycemia indicating patient is forgetting to take prandial insulin at times taking late leading to hypoglycemia.  Patient has not had her Dexcom due to not having transmitters asked to be free to a [employer]  paperwork will be sent to bring patient to the G7. Instructed patient's samples G7 is available the office she will come by after dialysis to  samples of the G7 to start until her supplies come in.        Endocrine clinic note 02/23/2024:  43-year-old female scheduled today for endocrine clinic follow-up.  History of type 2 diabetes with end-stage renal disease on dialysis, insulin dependent on transplant list.  Patient returns to clinic today current A1c has improved to 6.9 from previous 9.0.  Previously patient had Mounjaro added to her regimen on for 3-1/2 months is currently on 5 mg.  Patient's A1c is been obtained was in goal also patient needed to lose weight for her transplant to have dual organ transplant.  Patient has a Dexcom only has 1 day of data unable to interpret.  On patient's 1 day she would blood glucose of 186 w/ 56% in range.     The patient location is: Home   The chief complaint leading to consultation is: Uncontrolled Type 2 DM/ ESRD on dialysis/ Transplant wait list     Visit type: audiovisual    Face to Face time with patient: 14  36 minutes of total time spent on the encounter, which includes face to face time and non-face to face time preparing to see the patient (eg, review of tests), Obtaining and/or reviewing separately obtained history, Documenting clinical information in the electronic or other health record, Independently interpreting results (not separately reported) and communicating results to the patient/family/caregiver, or Care coordination (not separately reported).         Each patient to whom he or she provides medical services by telemedicine is:  (1) informed of the relationship between the physician and patient and the respective role of any other health care provider with respect to management of the patient; and (2) notified that he or she may decline to receive medical services by telemedicine and may withdraw from such care at any time.    Telemedicine Notes:  Endocrine Clinic Note 02/04/2025:  44-year-old female scheduled today for endocrine clinic follow-up.  History of uncontrolled type 2 diabetes, end-stage renal disease on dialysis currently on the kidney transplant wait list.  Uncontrolled type 2 diabetes patient's A1c improved to 5.4 from previous 6.7 and 9.01 year ago.  Patient needing to get diabetes under control for kidney transplant list.  Currently patient has a Dexcom but is not wearing now due to difficulty getting supplies.  She denies any significant hypoglycemia.  Patient has been on GLP 1 for approximately 1 year goal weight for transplant with a BMI maintain below 23.  Patient over the last year had lost over 50 lbs.  She currently also takes basal insulin along with prandial insulin 5-8 units she states she takes 10 units she has hypoglycemia so she does not take higher than 8 units with meals.  ESRD (end stage renal disease) on dialysis, pt has Dialysis Tuesday/Thursday/ Saturday and is on renal transplant list. Medications for DM limited to GLP-1 and insulin.             Review of Systems   Constitutional:  Negative for activity change, appetite change and fatigue.   HENT:  Negative for dental problem, hearing loss, tinnitus, trouble swallowing and goiter.    Eyes:  Negative for photophobia, pain and visual disturbance.   Respiratory:  Negative for cough, chest tightness and wheezing.    Cardiovascular:  Negative for chest pain, palpitations and leg swelling.   Gastrointestinal:  Negative for abdominal pain, constipation, diarrhea, nausea and reflux.   Endocrine: Negative for cold intolerance, heat intolerance, polydipsia and polyphagia.   Genitourinary:  Negative for difficulty urinating, flank pain, hematuria, hot flashes, menstrual irregularity, menstrual problem, nocturia and urgency.   Musculoskeletal:  Negative for back pain, gait problem, joint swelling, leg pain and joint deformity.   Integumentary:  Negative for color change, pallor, rash  and breast discharge.   Allergic/Immunologic: Negative for environmental allergies, food allergies and immunocompromised state.   Neurological:  Negative for tremors, seizures, headaches, memory loss and coordination difficulties.   Psychiatric/Behavioral:  Negative for agitation, behavioral problems and sleep disturbance. The patient is not nervous/anxious.           Objective     Physical Exam  Constitutional:       Appearance: Normal appearance.   HENT:      Head: Normocephalic.      Nose: Nose normal.   Eyes:      Conjunctiva/sclera: Conjunctivae normal.   Pulmonary:      Effort: Pulmonary effort is normal.   Musculoskeletal:      Cervical back: Normal range of motion.   Neurological:      Mental Status: She is alert.   Psychiatric:         Mood and Affect: Mood normal.         Behavior: Behavior normal.         Thought Content: Thought content normal.         Judgment: Judgment normal.            Assessment and Plan     1. Uncontrolled type 2 diabetes mellitus with hyperglycemia  Previous A1C 5.4 decreased from 6.7 and 9.0 1 year ago   A1C goal <7.0   Medications: Mounjaro 7.5 mg, Lantus 20 units once a day, Diana log 5-8 units TID with meals  Changes: Increase Mounjaro 10mg decrease lantus to 15 units and adjust basal insulin   Yearly Diabetic Eye Exam: Due unable to complete due to telehealth   Yearly Diabetic Foot Exam: Due unable to complete due to telehealth  Component Ref Range & Units 5 mo ago 6 mo ago 9 mo ago 1 yr ago 3 yr ago 4 yr ago 5 yr ago   Hemoglobin A1C 4.0 - 5.6 % 5.4 5.5 CM 6.7 High  CM 9.0 High  CM 6.9 R 12.1 High  R 10.6 High  R   -     tirzepatide 10 mg/0.5 mL PnIj; Inject 10 mg into the skin every 7 days.  Dispense: 9 mL; Refill: 3  -     insulin glargine U-100, Lantus, (BASAGLAR KWIKPEN U-100 INSULIN) 100 unit/mL (3 mL) InPn pen; Inject 20 Units into the skin once daily.  Dispense: 15 mL; Refill: 11  -     insulin aspart U-100 (NOVOLOG FLEXPEN U-100 INSULIN) 100 unit/mL (3 mL) InPn pen; 5  "units with small meals, 8 units with large meals max dose 25 units a day  Dispense: 15 mL; Refill: 11  -     pen needle, diabetic (TRUEPLUS PEN NEEDLE) 32 gauge x 5/32" Ndle; 4 times a day for insulin injections  Dispense: 150 each; Refill: 11       2. ESRD (end stage renal disease) on dialysis  Dialysis Tuesday/Thursday/ Saturday   On renal transplant list   Medications for DM limited to GLP-1 and insulin   Maintain BMI below 23 for transplant list         Follow up in about 6 months (around 8/4/2025) for Type 2 diabetes, w/ Dexcom.    I spent a total of 30 minutes on the day of the visit.  This includes face to face time and non-face to face time preparing to see the patient (eg, review of tests), obtaining and/or reviewing separately obtained history, documenting clinical information in the electronic or other health record, independently interpreting results and communicating results to the patient/family/caregiver, or care coordinator.    "

## 2025-02-04 NOTE — TELEPHONE ENCOUNTER
----- Message from Marilin sent at 2/4/2025 11:50 AM CST -----  Patient of Betsy     Patient does not want to increase Mounjaro     Patient request to leave dosage at 7.5mg    [phone]    Thanks    1982

## 2025-02-04 NOTE — TELEPHONE ENCOUNTER
---- Message from Marilin sent at 2/4/2025 11:50 AM CST -----  Patient of Betsy      Patient does not want to increase Mounjaro      Patient request to leave dosage at 7.5mg     [phone]     Thanks

## 2025-02-04 NOTE — TELEPHONE ENCOUNTER
----- Message from Marilin sent at 2/4/2025  8:40 AM CST -----  Patient of Betsy    Patient requesting refills on Monjaro    Please send to [facility] - [address]    [phone]    Thanks

## 2025-02-28 ENCOUNTER — COMMITTEE REVIEW (OUTPATIENT)
Dept: TRANSPLANT | Facility: CLINIC | Age: 45
End: 2025-02-28
Payer: MEDICARE

## 2025-02-28 NOTE — LETTER
February 28, 2025    Korey Mcgrath  [address]  [place] [address]          Dear Korey Mcgrath:  MRN: [id number]    It is the duty of the [facility] Selection Committee to determine which patients are candidates for a transplant. For this reason, our committee has the difficult task of evaluating patients to determine which ones have the greatest chance of having a successful transplant. We are aware of the magnitude of this responsibility, and we approach it with reverence and humility.    Your current health status was reviewed at a recent selection committee meeting.  It is with regret I inform you that you are no longer a suitable transplant candidate because of no response to 30 day removal letter, unsuccessful attempts to reach you by phone to assess interest in transplant at [facility], and frequent no shows to transplant appointments. Your name has been removed from the wait list effective 2/28/25.    The [facility] Selection Committee carefully considers each patient's transplant candidacy and determines whether it is safe to proceed with transplantation on a case-by-case basis using established selection criteria.  In the past, you were considered to be a suitable transplant candidate.  At present, the risk of proceeding with an elective transplant surgery has become too high.                                                                                                 Although the selection committee believes you are not a suitable transplant candidate, you have the option to be evaluated at other transplant centers.  You may request your [facility] records be sent to any center of your choice by contacting our Medical Records Department at [phone].                                                                               Attached is a letter from the [employer] ([employer]).  It describes the services and information offered to patients by [employer] and the  Organ Procurement and Transplant Network.                                                                                                                                      The [facility] sincerely wishes you the best and remains available to answer any questions.  Please do not hesitate to contact our pre-transplant office if we can assist you in any other way.                                                                               Sincerely,      Yvonne Whitlock MD  Medical Director, Kidney & Kidney/Pancreas Transplantation    Cc: Dr. Kirit Masterson        [facility] [place]               The Organ Procurement and Transplantation Network   Toll-free patient services line: [phone]  Your resource for organ transplant information      Staffed 8:30 am - 5:00 pm ET Monday - Friday   Leave a message 24/7 to receive a call back    The Organ Procurement and Transplantation Network (OPTN) is the national transplant system. It makes the policies that decide how donated organs are matched to patients waiting for a transplant. The OPTN:    Makes sure donated organs get matched to people on the transplant waiting list  Tells people about the donation and transplant processes  Makes sure that the public knows about the need for more organ and tissue donations    The OPTN has a free patient services line that you can call to:  Get more information about:   o Organ donation and organ transplants   o Donation and transplant policies  Get an information kit with:   o A list of transplant hospitals   o Waiting list information  Talk about any questions you may have about your transplant hospital or organ procurement organization. The staff will do their best to help you or point you to others who may help.  Find out how you can volunteer with the OPTN and help shape transplant policy    The patient services line number is: 3-691-229-8077    Patient services line staff CANNOT answer  questions about your own medical care, including:  Waiting list status  Test results  Medical records  You will need to call your transplant hospital for this information.    The following websites have more information about transplantation and donation:  OPTN: https://optn.transplant.hrsa.gov/  For potential living donors and transplant recipients:   o Living with transplant: https://www.transplantliving.org/   o Living donation process: https://optn.transplant.hrsa.gov/living-donation/     o Financial assistance: https://www.livingdonorassistance.org/  Transplantation data: https://www.srtr.org/  Organ donation: https://www.organdonor.gov/    Volunteer with the OPTN: https://optn.transplant.hrsa.gov/get-involved

## 2025-02-28 NOTE — COMMITTEE REVIEW
Native Organ Dx: Diabetes Mellitus - Type Other / Unknown      Not approved for LRD/CAD transplant due to no response to 30 day removal letter. Multiple attempts made to contact patient regarding interest in transplant. Pt to be removed from the wait list.       Note written by Georgiana Eid RN    ===============================================    I was present at the meeting and attest to the decision of the committee

## 2025-03-05 ENCOUNTER — HOSPITAL ENCOUNTER (OUTPATIENT)
Dept: RADIOLOGY | Facility: HOSPITAL | Age: 45
Discharge: HOME | End: 2025-03-05
Attending: NURSE PRACTITIONER
Payer: MEDICARE

## 2025-03-05 DIAGNOSIS — Z12.31 ENCOUNTER FOR SCREENING MAMMOGRAM FOR BREAST CANCER: ICD-10-CM

## 2025-03-05 PROCEDURE — 77063 BREAST TOMOSYNTHESIS BI: CPT | Mod: 26,,, | Performed by: RADIOLOGY

## 2025-03-05 PROCEDURE — 77067 SCR MAMMO BI INCL CAD: CPT | Mod: TC

## 2025-03-05 PROCEDURE — 77067 SCR MAMMO BI INCL CAD: CPT | Mod: 26,,, | Performed by: RADIOLOGY

## 2025-04-25 DIAGNOSIS — K64.9 HEMORRHOIDS, UNSPECIFIED HEMORRHOID TYPE: Primary | ICD-10-CM

## 2025-05-26 ENCOUNTER — OFFICE VISIT (OUTPATIENT)
Dept: GASTROENTEROLOGY | Facility: CLINIC | Age: 45
End: 2025-05-26
Payer: MEDICARE

## 2025-05-26 VITALS
WEIGHT: 151.38 LBS | TEMPERATURE: 98 F | HEART RATE: 92 BPM | SYSTOLIC BLOOD PRESSURE: 134 MMHG | BODY MASS INDEX: 25.84 KG/M2 | HEIGHT: 64 IN | DIASTOLIC BLOOD PRESSURE: 63 MMHG | RESPIRATION RATE: 16 BRPM | OXYGEN SATURATION: 100 %

## 2025-05-26 DIAGNOSIS — K64.4 INTERNAL AND EXTERNAL HEMORRHOIDS WITHOUT COMPLICATION: ICD-10-CM

## 2025-05-26 DIAGNOSIS — K64.8 INTERNAL AND EXTERNAL HEMORRHOIDS WITHOUT COMPLICATION: ICD-10-CM

## 2025-05-26 PROCEDURE — 99204 OFFICE O/P NEW MOD 45 MIN: CPT | Mod: S$PBB,,, | Performed by: NURSE PRACTITIONER

## 2025-05-26 PROCEDURE — 99215 OFFICE O/P EST HI 40 MIN: CPT | Mod: PBBFAC | Performed by: NURSE PRACTITIONER

## 2025-05-26 RX ORDER — GABAPENTIN 100 MG/1
CAPSULE ORAL
COMMUNITY
Start: 2025-05-16

## 2025-05-26 RX ORDER — INSULIN ASPART 100 [IU]/ML
8 INJECTION, SUSPENSION SUBCUTANEOUS 2 TIMES DAILY PRN
COMMUNITY

## 2025-05-26 RX ORDER — HYDROCORTISONE 25 MG/G
CREAM TOPICAL 2 TIMES DAILY
Qty: 30 G | Refills: 2 | Status: SHIPPED | OUTPATIENT
Start: 2025-05-26

## 2025-05-26 RX ORDER — PRAVASTATIN SODIUM 20 MG/1
20 TABLET ORAL NIGHTLY
COMMUNITY
Start: 2024-12-27

## 2025-05-26 RX ORDER — PSYLLIUM HUSK 0.4 G
2 CAPSULE ORAL 2 TIMES DAILY
Qty: 120 CAPSULE | Refills: 5 | Status: SHIPPED | OUTPATIENT
Start: 2025-05-26

## 2025-05-26 NOTE — ASSESSMENT & PLAN NOTE
Recommend soluble fiber supplementation  Avoid straining or sitting on the toilet for long periods of time  She underwent colonoscopy February 9, 2022 with findings of perianal skin tags found on perianal exam, examined portion of ileum normal, external and internal hemorrhoids, no specimens collected with a recommended recall of 10 years.  Sitz baths and tucks pads  Anusol as directed  Limit use of Linzess as currently doing  Hemorrhoidal banding discussed  Referral to General surgery ordered   ER precautions provided   Call with updates   Follow-up clinic visit with NP in 6 months

## 2025-05-26 NOTE — PROGRESS NOTES
Subjective:       Patient ID: Korey Mcgrath is a 45 y.o. female.    Chief Complaint: Hemorrhoids (Pt reports she has external hemorrhoids that get inflamed after BM and are painful. Scant blood noted while cleaning after BM. )    This 45-year-old  female with hypertension, hyperlipidemia, diabetes mellitus, vitamin-D deficiency, ESRD on dialysis Tuesday, Thursday, Saturday (currently on transplant list) is referred for hemorrhoids.  She presents accompanied by her son.  She was last seen by me in 2022 and underwent hemorrhoidal banding x3 with good relief noted.  She reports more problems with external hemorrhoids over the past several months with intermittent hemorrhoidal flares every other month.  She has been on Mounjaro since November 2023 and has postprandial soft to loose stools since being on this medication.  External hemorrhoids will become large and painful with frequent stooling at times and she has occasional rectal itching and red blood with bowel movements noted on the tissue after wiping.  She is interested in referral to General surgery at this time.  She uses OTC preparation H with some relief noted.  She denies melena, fecal urgency, or fecal incontinence.  She denies abdominal pain.  She denies appetite changes, unintentional weight loss, fever, chills, nausea, vomiting, hematemesis, odynophagia, dysphagia, acid reflux, pyrosis, early satiety, belching, or bloating.  She denies taking Linzess 145 mcg daily secondary to loose stools on Mounjaro.    CT scan abdomen and pelvis without contrast February 12, 2025 revealed no acute abdominal or pelvic abnormality.  Kidneys are relative small with perihilar small vessel calcifications concordant with end-stage renal disease.  The uterus is enlarged with a fibroid at fundus.  Moderate stool burden distends the colon.    She underwent colonoscopy February 9, 2022 with findings of perianal skin tags found on perianal exam, examined  portion of ileum normal, external and internal hemorrhoids, no specimens collected with a recommended recall of 10 years.    She denies ever having an EGD done.  She denies regular NSAID use or use of blood thinners.  She denies tobacco or alcohol use.  She denies illicit drug use.  Her paternal uncle has a history of colon cancer diagnosed in his 40s.      Review of patient's allergies indicates:   Allergen Reactions    Nitrofurantoin monohyd/m-cryst Anaphylaxis, Hives and Swelling     CLOSES THROAT    Ibuprofen Swelling    Hydromorphone Nausea And Vomiting     Past Medical History:   Diagnosis Date    Anemia     Diabetes mellitus     Disorder of kidney and ureter     GERD (gastroesophageal reflux disease)     Hyperlipidemia     Hypertension     IDDM (insulin dependent diabetes mellitus)     Obesity     Preeclampsia      Past Surgical History:   Procedure Laterality Date    AV FISTULA PLACEMENT       SECTION       Family History:   family history includes Asthma in her sister; Breast cancer in her mother; Diabetes in her maternal aunt and maternal uncle; Heart disease in her maternal grandfather and maternal grandmother; Hypertension in her father and mother; Kidney disease in her paternal uncle; Stroke in her maternal grandmother; Tuberculosis in her mother.    Social History:    reports that she has never smoked. She has never used smokeless tobacco. She reports that she does not drink alcohol and does not use drugs.    Review of Systems  Negative except as noted in the HPI.      Objective:      Physical Exam  Constitutional:       Appearance: Normal appearance.   HENT:      Head: Normocephalic.      Mouth/Throat:      Mouth: Mucous membranes are moist.   Eyes:      Extraocular Movements: Extraocular movements intact.      Conjunctiva/sclera: Conjunctivae normal.      Pupils: Pupils are equal, round, and reactive to light.   Cardiovascular:      Rate and Rhythm: Normal rate and regular rhythm.       Pulses: Normal pulses.      Heart sounds: Normal heart sounds.   Pulmonary:      Effort: Pulmonary effort is normal.      Breath sounds: Normal breath sounds.   Abdominal:      General: Bowel sounds are normal.      Palpations: Abdomen is soft.   Musculoskeletal:         General: Normal range of motion.      Cervical back: Normal range of motion and neck supple.   Skin:     General: Skin is warm and dry.   Neurological:      General: No focal deficit present.      Mental Status: She is alert and oriented to person, place, and time.   Psychiatric:         Mood and Affect: Mood normal.         Behavior: Behavior normal.         Thought Content: Thought content normal.         Judgment: Judgment normal.         Home Medications:     Current Outpatient Medications   Medication Sig    amLODIPine (NORVASC) 10 MG tablet Take 10 mg by mouth once daily.    BIOTIN ORAL Take 1 tablet by mouth once daily.    blood-glucose meter (FREESTYLE SYSTEM KIT) kit Use as instructed    cinacalcet HCl (SENSIPAR ORAL) Take 90 mg by mouth.    dextroamphetamine-amphetamine (ADDERALL XR) 20 MG 24 hr capsule Take 20 mg by mouth every morning.    gabapentin (NEURONTIN) 100 MG capsule Take by mouth.    HYDROcodone-acetaminophen (NORCO) 7.5-325 mg per tablet Take 1 tablet by mouth 2 (two) times daily as needed.    insulin asp prt-insulin aspart, NovoLOG 70/30, (NOVOLOG 70/30) 100 unit/mL (70-30) Soln Inject 8 Units into the skin 2 (two) times daily as needed.    insulin aspart U-100 (NOVOLOG FLEXPEN U-100 INSULIN) 100 unit/mL (3 mL) InPn pen 5 units with small meals, 8 units with large meals max dose 25 units a day    insulin glargine U-100, Lantus, (BASAGLAR KWIKPEN U-100 INSULIN) 100 unit/mL (3 mL) InPn pen Inject 20 Units into the skin once daily.    lancets (ONETOUCH ULTRASOFT LANCETS) Misc Lancets and strips to check CBGS 4 times per day and with any symptoms of hypoglycemia    levocetirizine (XYZAL) 5 MG tablet Take 5 mg by mouth once daily.  "   methoxy peg-epoetin beta (MIRCERA INJ) 75 mcg.    pantoprazole (PROTONIX) 40 MG tablet Take 40 mg by mouth once daily.    pen needle, diabetic (TRUEPLUS PEN NEEDLE) 32 gauge x 5/32" Ndle 4 times a day for insulin injections    pravastatin (PRAVACHOL) 20 MG tablet Take 20 mg by mouth every evening.    tirzepatide (MOUNJARO) 7.5 mg/0.5 mL PnIj Inject 7.5 mg into the skin every 7 days.    VELPHORO 500 mg Chew Take 3,000 mg by mouth 2 hours after meals and at bedtime. 2000mg with  snacks    hydrocortisone 2.5 % cream Apply topically 2 (two) times daily.    psyllium husk (METAMUCIL) 0.4 gram Cap Take 2 capsules by mouth 2 (two) times a day.     No current facility-administered medications for this visit.     Laboratory Results:     Recent Results (from the past 24 weeks)   HEMATOLOGY ANALYSIS    Collection Time: 12/10/24 12:00 AM   Result Value Ref Range    Hemoglobin 9.8 (L) 12.0 - 16.0 g/dL    Hemoglobin x 3 29.4 (L) 36.0 - 48.0 %   Spectrae Chemistry    Collection Time: 12/12/24 12:00 AM   Result Value Ref Range    Blood Urea Nitrogen 45 (H) 6 - 19 mg/dL   HLA PRA Screen    Collection Time: 12/15/24 10:48 AM   Result Value Ref Range    HPRA Interpretation       This HPRA test has been reflexed to a [facility] PRA Specificity.  This HPRA test has been reflexed to a [facility] PRA Specificity.     HLA Class I & II PRA Specificity - [facility]    Collection Time: 12/15/24 10:48 AM   Result Value Ref Range    SPCLU Testing Date 01/15/2025 10:59 AM     cPRA % 3     SPCL1 Testing Date 01/15/2025 11:39 PM     Serum Collection DT - [facility] Class I 12/15/2024 10:48 AM     Class I Antibodies - [facility] A25     Class I Antibody Comments - [facility] S95--HTLC: A43     SPCL2 Testing Date 01/15/2025 11:41 PM     Serum Collection DT - [facility] Class II 12/15/2024 10:48 AM     Class II Antibody Comments - [facility] DP1--WEAK: DP14    Spectrae Chemistry    Collection Time: 12/17/24 12:00 AM   Result Value Ref Range    PTH, Intact 840 (H) 16 - 80 " pg/mL   Spectrae Chemistry    Collection Time: 12/19/24 12:00 AM   Result Value Ref Range    Blood Urea Nitrogen 45 (H) 6 - 19 mg/dL   HEMATOLOGY ANALYSIS    Collection Time: 12/23/24 12:00 AM   Result Value Ref Range    Hemoglobin 10.6 (L) 12.0 - 16.0 g/dL    Hemoglobin x 3 31.8 (L) 36.0 - 48.0 %   BASIC METABOLIC PANEL    Collection Time: 12/27/24  6:13 AM   Result Value Ref Range    Creatinine 6.76 (H) 0.55 - 1.02 mg/dL    Blood Urea Nitrogen 28 (H) 5 - 25 mg/dL    Sodium 137 136 - 145 mmol/L    Potassium 4.1 3.5 - 5.1 mmol/L    Chloride 99 (L) 100 - 109 mmol/L    Carbon Dioxide 24 22 - 33 mmol/L    Glucose Screen 120 (H) 70 - 100 mg/dL    Calcium 9.3 8.8 - 10.6 mg/dL    Anion Gap 14 8 - 16 mmol/L    eGFR African American 7 mL/min/1.73mSq   POCT GLUCOSE    Collection Time: 12/27/24  6:24 AM   Result Value Ref Range    POC Glucose 107 (H) 70 - 100 mg/dL   HEMATOLOGY ANALYSIS    Collection Time: 12/30/24 12:00 AM   Result Value Ref Range    Hemoglobin 10.6 (L) 12.0 - 16.0 g/dL    Hemoglobin x 3 31.8 (L) 36.0 - 48.0 %   HEMATOLOGY ANALYSIS    Collection Time: 01/07/25 12:00 AM   Result Value Ref Range    Hemoglobin 10.2 (L) 12.0 - 16.0 g/dL    Hemoglobin x 3 30.6 (L) 36.0 - 48.0 %   Spectrae Chemistry    Collection Time: 01/09/25 12:00 AM   Result Value Ref Range    Blood Urea Nitrogen 30 (H) 6 - 19 mg/dL   Spectrae Chemistry    Collection Time: 01/14/25 12:00 AM   Result Value Ref Range    Blood Urea Nitrogen 47 (H) 6 - 19 mg/dL   HEMATOLOGY ANALYSIS    Collection Time: 01/14/25 12:00 AM   Result Value Ref Range    Hemoglobin 10.2 (L) 12.0 - 16.0 g/dL    Hemoglobin x 3 30.6 (L) 36.0 - 48.0 %   Spectrae Chemistry    Collection Time: 01/20/25 12:00 AM   Result Value Ref Range    PTH, Intact 706 (H) 16 - 80 pg/mL   HEMATOLOGY ANALYSIS    Collection Time: 01/28/25 12:00 AM   Result Value Ref Range    Hemoglobin 10.5 (L) 12.0 - 16.0 g/dL    Hemoglobin x 3 31.5 (L) 36.0 - 48.0 %   Spectrae Chemistry    Collection Time:  02/04/25 12:00 AM   Result Value Ref Range    Blood Urea Nitrogen 39 (H) 6 - 19 mg/dL    Calcium 8.7 8.7 - 10.4 mg/dL   HEMATOLOGY ANALYSIS    Collection Time: 02/04/25 12:00 AM   Result Value Ref Range    Hemoglobin 9.7 (L) 12.0 - 16.0 g/dL    Hemoglobin x 3 29.1 (L) 36.0 - 48.0 %   HEMATOLOGY ANALYSIS    Collection Time: 02/11/25 12:00 AM   Result Value Ref Range    Hemoglobin 10.6 (L) 12.0 - 16.0 g/dL    Hemoglobin x 3 31.8 (L) 36.0 - 48.0 %   HLA Class I & II PRA Specificity - [facility]    Collection Time: 02/11/25  1:57 PM   Result Value Ref Range    SPCLU Testing Date 03/14/2025 08:43 PM     cPRA % 3     SPCL1 Testing Date 03/14/2025 03:54 PM     Serum Collection DT - [facility] Class I 02/11/2025 02:57 PM     Class I Antibodies - [facility] A25     Class I Antibody Comments - [facility] WEAK, A43, A66     SPCL2 Testing Date 03/14/2025 04:08 PM     Serum Collection DT - [facility] Class II 02/11/2025 02:57 PM     Class II Antibody Comments - [facility] DP1,    HLA PRA Screen    Collection Time: 02/11/25  2:57 PM   Result Value Ref Range    HPRA Interpretation       This HPRA test has been reflexed to a [facility] PRA Specificity.  This HPRA test has been reflexed to a [facility] PRA Specificity.     Spectrae Chemistry    Collection Time: 02/18/25 12:00 AM   Result Value Ref Range    PTH, Intact 401 (H) 16 - 80 pg/mL   Spectrae Chemistry    Collection Time: 02/20/25 12:00 AM   Result Value Ref Range    Blood Urea Nitrogen 51 (H) 6 - 19 mg/dL   HEMATOLOGY ANALYSIS    Collection Time: 02/25/25 12:00 AM   Result Value Ref Range    Hemoglobin 10.7 (L) 12.0 - 16.0 g/dL    Hemoglobin x 3 32.1 (L) 36.0 - 48.0 %    Reticulocyte Hemoglobin Equivalent 30.9 25.4 - 31.8 pg   HEMOGLOBIN A1C    Collection Time: 03/03/25  6:29 AM   Result Value Ref Range    Hemoglobin A1C 5.6 4.0 - 5.6 %   HEMATOLOGY ANALYSIS    Collection Time: 03/04/25 12:00 AM   Result Value Ref Range    Hemoglobin 11.6 (L) 12.0 - 16.0 g/dL    Hemoglobin x 3 34.8 (L) 36.0 - 48.0  %   POCT GLUCOSE    Collection Time: 03/07/25  6:03 AM   Result Value Ref Range    POC Glucose 129 (H) 70 - 100 mg/dL   BASIC METABOLIC PANEL    Collection Time: 03/07/25  6:51 AM   Result Value Ref Range    Creatinine 10.84 (H) 0.55 - 1.02 mg/dL    Blood Urea Nitrogen 76 (H) 5 - 25 mg/dL    Sodium 136 136 - 145 mmol/L    Potassium 4.9 3.5 - 5.1 mmol/L    Chloride 96 (L) 100 - 109 mmol/L    Carbon Dioxide 22 22 - 33 mmol/L    Glucose Screen 140 (H) 70 - 100 mg/dL    Calcium 8.9 8.8 - 10.6 mg/dL    Anion Gap 18 (H) 5 - 13 mmol/L    eGFR African American 4 mL/min/1.73mSq   HEMATOLOGY ANALYSIS    Collection Time: 03/11/25 12:00 AM   Result Value Ref Range    Hemoglobin 10.4 (L) 12.0 - 16.0 g/dL    Hemoglobin x 3 31.2 (L) 36.0 - 48.0 %   Spectrae Chemistry    Collection Time: 03/11/25 12:00 AM   Result Value Ref Range    Blood Urea Nitrogen 82 (H) 6 - 19 mg/dL   Spectrae Chemistry    Collection Time: 03/18/25 12:00 AM   Result Value Ref Range    PTH, Intact 497 (H) 16 - 80 pg/mL   HEMATOLOGY ANALYSIS    Collection Time: 03/25/25 12:00 AM   Result Value Ref Range    Hemoglobin 9.9 (L) 12.0 - 16.0 g/dL    Hemoglobin x 3 29.7 (L) 36.0 - 48.0 %   Spectrae Chemistry    Collection Time: 04/01/25 12:00 AM   Result Value Ref Range    Calcium 8.9 8.7 - 10.4 mg/dL   HEMATOLOGY ANALYSIS    Collection Time: 04/01/25 12:00 AM   Result Value Ref Range    Hemoglobin 9.4 (L) 12.0 - 16.0 g/dL    Hemoglobin x 3 28.2 (L) 36.0 - 48.0 %   Spectrae Chemistry    Collection Time: 04/03/25 12:00 AM   Result Value Ref Range    Blood Urea Nitrogen 66 (H) 6 - 19 mg/dL   HEMATOLOGY ANALYSIS    Collection Time: 04/08/25 12:00 AM   Result Value Ref Range    Hemoglobin 9.3 (L) 12.0 - 16.0 g/dL    Hemoglobin x 3 27.9 (L) 36.0 - 48.0 %   BASIC METABOLIC PANEL    Collection Time: 04/17/25 10:29 AM   Result Value Ref Range    Creatinine 13.58 (H) 0.55 - 1.02 mg/dL    Blood Urea Nitrogen 99 (H) 5 - 25 mg/dL    Sodium 136 136 - 145 mmol/L    Potassium 5.2  (H) 3.5 - 5.1 mmol/L    Chloride 100 100 - 109 mmol/L    Carbon Dioxide 15 (L) 22 - 33 mmol/L    Glucose Screen 134 (H) 70 - 100 mg/dL    Calcium 8.7 (L) 8.8 - 10.6 mg/dL    Anion Gap 21 (H) 5 - 13 mmol/L    eGFR African American 3 mL/min/1.73mSq   Spectrae Chemistry    Collection Time: 04/22/25 12:00 AM   Result Value Ref Range    PTH, Intact 1,283 (H) 16 - 80 pg/mL   Spectrae Chemistry    Collection Time: 04/29/25 12:00 AM   Result Value Ref Range    Phosphorus Level 12.2 (H) 2.6 - 4.5 mg/dL   HEMATOLOGY ANALYSIS    Collection Time: 04/29/25 12:00 AM   Result Value Ref Range    Hemoglobin 10.3 (L) 12.0 - 16.0 g/dL    Hemoglobin x 3 30.9 (L) 36.0 - 48.0 %   Spectrae Chemistry    Collection Time: 04/29/25 12:00 AM   Result Value Ref Range    PTH, Intact 1,016 (H) 16 - 80 pg/mL   Spectrae Chemistry    Collection Time: 05/06/25 12:00 AM   Result Value Ref Range    Blood Urea Nitrogen 70 (H) 6 - 19 mg/dL   HEMATOLOGY ANALYSIS    Collection Time: 05/06/25 12:00 AM   Result Value Ref Range    Hemoglobin 10.2 (L) 12.0 - 16.0 g/dL    Hemoglobin x 3 30.6 (L) 36.0 - 48.0 %   Spectrae Chemistry    Collection Time: 05/13/25 12:00 AM   Result Value Ref Range    Blood Urea Nitrogen 70 (H) 6 - 19 mg/dL   HEMATOLOGY ANALYSIS    Collection Time: 05/13/25 12:00 AM   Result Value Ref Range    Hemoglobin 10.8 (L) 12.0 - 16.0 g/dL    Hemoglobin x 3 32.4 (L) 36.0 - 48.0 %   Spectrae Chemistry    Collection Time: 05/20/25 12:00 AM   Result Value Ref Range    PTH, Intact 898 (H) 16 - 80 pg/mL     Imaging Results:     CT Abdomen Pelvis  Without Contrast  Order: [id number]  Impression      No acute abdominal or pelvic abnormality.    Kidneys are relative small with perihilar small vessel calcifications  concordant with end-stage renal disease.    The uterus is enlarged with a fibroid at fundus.    Moderate stool burden distends the colon.  Narrative    EXAM: CT ABDOMEN AND PELVIS WITHOUT CONTRAST    HISTORY: 44-year-old female, transplant  same date testing    COMPARISON: 4/22/2022    TECHNIQUE AND FINDINGS: Contiguous axial imaging from the level of the lung  bases through the pubic symphysis was performed without contrast. Coronal  and sagittal reconstructions were obtained.    FINDINGS:    LOWER THORAX: The lungs bases are clear. No cardiomegaly. Moderate coronary  calcifications present.    LIVER: No focal hepatic lesions. The hepatic veins are prominent suggesting  volume overload. No biliary ductal dilation.    GALLBLADDER AND BILIARY TREE: No biliary ductal dilation. The gallbladder  is distended without signs of acute inflammation.    SPLEEN: No splenomegaly.    PANCREAS: No ductal dilation or masses.    ADRENAL GLANDS: No adrenal nodules.    KIDNEYS: The kidneys are relative small with perihilar calcifications. No  hydronephrosis, stones, or masses.    PERITONEUM AND RETROPERITONEUM: No free air or fluid.    LYMPH NODES: No lymphadenopathy.    GI TRACT: No dilation or wall thickening. The colon and rectum are  significant distended with stool/    PELVIS/BLADDER: The urinary bladder is decompressed with a fibroid at  fundus.    VESSELS: Small vessel calcifications are prominent.    BONES AND SOFT TISSUES: No suspicious lytic or sclerotic bony lesions.  Abdominal soft tissue subcutaneous edema.    Assessment/Plan:     Problem List Items Addressed This Visit          GI    Internal and external hemorrhoids without complication    Recommend soluble fiber supplementation  Avoid straining or sitting on the toilet for long periods of time  She underwent colonoscopy February 9, 2022 with findings of perianal skin tags found on perianal exam, examined portion of ileum normal, external and internal hemorrhoids, no specimens collected with a recommended recall of 10 years.  Sitz baths and tucks pads  Anusol as directed  Limit use of Linzess as currently doing  Hemorrhoidal banding discussed  Referral to General surgery ordered   ER precautions provided    Call with updates   Follow-up clinic visit with NP in 6 months         Relevant Medications    hydrocortisone 2.5 % cream    psyllium husk (METAMUCIL) 0.4 gram Cap    Other Relevant Orders    Ambulatory referral/consult to General Surgery

## 2025-06-17 ENCOUNTER — OFFICE VISIT (OUTPATIENT)
Dept: SURGERY | Facility: CLINIC | Age: 45
End: 2025-06-17
Payer: MEDICARE

## 2025-06-17 VITALS
WEIGHT: 145.81 LBS | SYSTOLIC BLOOD PRESSURE: 142 MMHG | DIASTOLIC BLOOD PRESSURE: 60 MMHG | RESPIRATION RATE: 20 BRPM | HEIGHT: 64 IN | OXYGEN SATURATION: 100 % | HEART RATE: 99 BPM | BODY MASS INDEX: 24.89 KG/M2 | TEMPERATURE: 98 F

## 2025-06-17 DIAGNOSIS — K64.4 INTERNAL AND EXTERNAL HEMORRHOIDS WITHOUT COMPLICATION: ICD-10-CM

## 2025-06-17 DIAGNOSIS — K64.8 INTERNAL AND EXTERNAL HEMORRHOIDS WITHOUT COMPLICATION: ICD-10-CM

## 2025-06-17 PROCEDURE — 99215 OFFICE O/P EST HI 40 MIN: CPT | Mod: PBBFAC

## 2025-06-17 RX ORDER — LIDOCAINE HYDROCHLORIDE AND HYDROCORTISONE ACETATE 30; 5 MG/G; MG/G
1 CREAM TOPICAL 2 TIMES DAILY
Qty: 28.3 G | Refills: 0 | Status: SHIPPED | OUTPATIENT
Start: 2025-06-17

## 2025-06-17 NOTE — PROGRESS NOTES
I have reviewed the notes, assessments, and/or procedures performed by Dr Yeung, I concur with her/his documentation of Korey Mcgrath.  Date of Service: 6/17/2025    [facility]

## 2025-06-17 NOTE — PROGRESS NOTES
[facility] GENERAL SURGERY   Clinic Note     HPI:   Korey Mcgrath is a 45 y.o. with PMHx of CKD (on dialysis, pt on waiting list for kidney and pancreas transplant), HTN, HLD, T1DM (dx at 14) presents as a referral from Dr. Joseph for hemorrhoids. Pt reports a long hx of internal and external hemorrhoids. Previously banded internal in  by Dr. Joseph; external were asymptomatic until recently. Now cause significant pain and itching and pt is interested in surgery, as pt reports conservative management has failed. Bowel movements very regular, if anything they are soft. Routine colonoscopy normal.     Review of Systems:  10 point review of systems denied unless otherwise indicated above HPI    Allergies:   Review of patient's allergies indicates:   Allergen Reactions    Nitrofurantoin monohyd/m-cryst Anaphylaxis, Hives and Swelling     CLOSES THROAT    Ibuprofen Swelling    Hydromorphone Nausea And Vomiting       Meds:   Current Medications[1]    PMH:   Past Medical History:   Diagnosis Date    Anemia     Diabetes mellitus     Disorder of kidney and ureter     GERD (gastroesophageal reflux disease)     Hyperlipidemia     Hypertension     IDDM (insulin dependent diabetes mellitus)     Obesity     Preeclampsia         Social History:   Social History[2]    Family History:   Family History   Problem Relation Name Age of Onset    Tuberculosis Mother      Hypertension Mother      Breast cancer Mother      Hypertension Father      Asthma Sister      Diabetes Maternal Aunt      Diabetes Maternal Uncle      Kidney disease Paternal Uncle      Stroke Maternal Grandmother      Heart disease Maternal Grandmother      Heart disease Maternal Grandfather         Surgical History:   Past Surgical History:   Procedure Laterality Date    AV FISTULA PLACEMENT       SECTION         Objective:  Vitals:  Vitals:    25 1226   BP: (!) 136/98   Pulse: 99   Resp: 20   Temp: 97.7 °F (36.5 °C)        Physical Exam:  Gen:  NAD  Neuro: awake, alert, answering questions appropriately  CV: RRR  Resp: non-labored breathing  Abd: soft, ND, NT   Large anterior and posterior external hemorrhoids    Ext: moves all 4 spontaneously and purposefully  Skin: warm, well perfused    Assessment/Plan:  Korey Mcgrath is a 45 y.o. with PMHx of CKD (on dialysis, pt on waiting list for kidney and pancreas transplant), HTN, HLD, T1DM (dx at 14) presents with symptomatic external hemorrhoids that have failed conservative management. Due to pt currently high on transplant list, we would like to defer management of hemorrhoids to ensure pt does not lose eligibility for transplant surgery.    - RTC PRN    Lorna Medrano, MS3  [facility] General Surgery     RESIDENT ATTESTATION    I have seen and evaluated the patient with the student doctor. I agree with the plan as written and made adjustments as needed.  Discussed pros and cons of hemorrhoidectomy surgery in setting of anticipated KP transplant.  Recommend holding off on elective hemorrhoidectomy until the patient has recovered from transplant.  We will prescribe hydrocortisone cream for relief in meantime.    Bart Yeung MD  [facility] General Surgery PGY-3  1:26 PM             [1]   Current Outpatient Medications:     amLODIPine (NORVASC) 10 MG tablet, Take 10 mg by mouth once daily., Disp: , Rfl:     BIOTIN ORAL, Take 1 tablet by mouth once daily., Disp: , Rfl:     blood-glucose meter (FREESTYLE SYSTEM KIT) kit, Use as instructed, Disp: 1 each, Rfl: 0    cinacalcet HCl (SENSIPAR ORAL), Take 90 mg by mouth., Disp: , Rfl:     dextroamphetamine-amphetamine (ADDERALL XR) 20 MG 24 hr capsule, Take 20 mg by mouth every morning., Disp: , Rfl:     gabapentin (NEURONTIN) 100 MG capsule, Take by mouth., Disp: , Rfl:     HYDROcodone-acetaminophen (NORCO) 7.5-325 mg per tablet, Take 1 tablet by mouth 2 (two) times daily as needed., Disp: , Rfl:     hydrocortisone 2.5 % cream, Apply topically 2 (two) times daily., Disp: 30 g,  "Rfl: 2    insulin asp prt-insulin aspart, NovoLOG 70/30, (NOVOLOG 70/30) 100 unit/mL (70-30) Soln, Inject 8 Units into the skin 2 (two) times daily as needed., Disp: , Rfl:     insulin aspart U-100 (NOVOLOG FLEXPEN U-100 INSULIN) 100 unit/mL (3 mL) InPn pen, 5 units with small meals, 8 units with large meals max dose 25 units a day, Disp: 15 mL, Rfl: 11    insulin glargine U-100, Lantus, (BASAGLAR KWIKPEN U-100 INSULIN) 100 unit/mL (3 mL) InPn pen, Inject 20 Units into the skin once daily., Disp: 15 mL, Rfl: 11    lancets (ONETOUCH ULTRASOFT LANCETS) Misc, Lancets and strips to check CBGS 4 times per day and with any symptoms of hypoglycemia, Disp: 150 each, Rfl: 11    levocetirizine (XYZAL) 5 MG tablet, Take 5 mg by mouth once daily., Disp: , Rfl:     methoxy peg-epoetin beta (MIRCERA INJ), 75 mcg., Disp: , Rfl:     pantoprazole (PROTONIX) 40 MG tablet, Take 40 mg by mouth once daily., Disp: , Rfl:     pen needle, diabetic (TRUEPLUS PEN NEEDLE) 32 gauge x 5/32" Ndle, 4 times a day for insulin injections, Disp: 150 each, Rfl: 11    pravastatin (PRAVACHOL) 20 MG tablet, Take 20 mg by mouth every evening., Disp: , Rfl:     psyllium husk (METAMUCIL) 0.4 gram Cap, Take 2 capsules by mouth 2 (two) times a day., Disp: 120 capsule, Rfl: 5    tirzepatide (MOUNJARO) 7.5 mg/0.5 mL PnIj, Inject 7.5 mg into the skin every 7 days., Disp: 2 mL, Rfl: 11    VELPHORO 500 mg Chew, Take 3,000 mg by mouth 2 hours after meals and at bedtime. 2000mg with  snacks, Disp: , Rfl:   [2]   Social History  Tobacco Use    Smoking status: Never    Smokeless tobacco: Never   Substance Use Topics    Alcohol use: Never    Drug use: Never     "